# Patient Record
Sex: FEMALE | Race: WHITE | NOT HISPANIC OR LATINO | URBAN - METROPOLITAN AREA
[De-identification: names, ages, dates, MRNs, and addresses within clinical notes are randomized per-mention and may not be internally consistent; named-entity substitution may affect disease eponyms.]

---

## 2017-08-17 ENCOUNTER — TRANSCRIBE ORDERS (OUTPATIENT)
Dept: PHYSICAL THERAPY | Facility: HOSPITAL | Age: 57
End: 2017-08-17

## 2017-08-17 ENCOUNTER — HOSPITAL ENCOUNTER (OUTPATIENT)
Dept: PHYSICAL THERAPY | Facility: HOSPITAL | Age: 57
Setting detail: THERAPIES SERIES
Discharge: HOME | End: 2017-08-17

## 2017-08-17 DIAGNOSIS — M47.816 OSTEOARTHRITIS OF LUMBAR SPINE, UNSPECIFIED SPINAL OSTEOARTHRITIS COMPLICATION STATUS: Primary | ICD-10-CM

## 2017-08-17 DIAGNOSIS — M79.662 BILATERAL CALF PAIN: ICD-10-CM

## 2017-08-17 DIAGNOSIS — M54.50 CHRONIC BILATERAL LOW BACK PAIN WITHOUT SCIATICA: Primary | ICD-10-CM

## 2017-08-17 DIAGNOSIS — M79.661 BILATERAL CALF PAIN: ICD-10-CM

## 2017-08-17 DIAGNOSIS — G89.29 CHRONIC BILATERAL LOW BACK PAIN WITHOUT SCIATICA: Primary | ICD-10-CM

## 2017-08-17 PROCEDURE — 97162 PT EVAL MOD COMPLEX 30 MIN: CPT

## 2017-08-18 NOTE — THERAPY EVALUATION
Outpatient Physical Therapy Ortho Initial Evaluation  [facility]     Patient Name: Nancy Thomas  : 1960  MRN: [id number]  Today's Date: 2017      Visit Date: 2017      Past Medical History:   Diagnosis Date   • OA (osteoarthritis of spine)    • Osteoporosis    • Vitamin D deficiency        Visit Dx:     ICD-10-CM ICD-9-CM   1. Chronic bilateral low back pain without sciatica M54.5 724.2    G89.29 338.29   2. Bilateral calf pain M79.661 729.5    M79.662              Patient History       17 1345          History    Chief Complaint Difficulty with daily activities;Joint stiffness;Muscle tenderness;Muscle weakness;Pain  -MM      Type of Pain Back pain   Also reported throughout her legs, in her left arm  -MM      Date Current Problem(s) Began 17  -MM      Brief Description of Current Complaint Client presents with low back pain that worsened about 2 months ago when she pushed her bed and mattress.  She also reports pain in the joints throughout her legs.  She reports pain also in her left arm.  Frequently at night she is waking with muscle cramping and both calf muscles.  -MM      Patient/Caregiver Goals Relieve pain;Improve mobility;Improve strength;Return to prior level of function  -MM      What clinical tests have you had for this problem? X-ray  -MM      Results of Clinical Tests OA of spine  -MM      Are you or can you be pregnant No  -MM      Pain     Pain Location Back   both legs/hips, and left UE  -MM      Pain at Present 6  -MM      Pain at Best 0  -MM      Pain at Worst 8  -MM      Pain Frequency Intermittent   daily  -MM      Pain Description Aching;Throbbing;Tiring  -MM      Pain Comments Pain is worse with activity.  -MM      Is your sleep disturbed? --   sometimes wake at night with calf muscles cramping  -MM      Difficulties with ADL's? unable to lift anything heavy, prolonged standing and cooking, unable to sit for long periods, difficulty cleaning  -MM       Fall Risk Assessment    Any falls in the past year: No  -MM      Daily Activities    Primary Language Faroese  -MM      Action taken if English not primary language Her  interprets  -MM      Are you able to read Yes   Yi  -MM      Are you able to write Yes   Yi  -MM      How does patient learn best? Demonstration  -MM      Pt Participated in POC and Goals Yes  -MM        User Key  (r) = Recorded By, (t) = Taken By, (c) = Cosigned By    Initials Name Provider Type    MM Alverto Calderon, PT Physical Therapist                PT Ortho       08/17/17 6284    Posture/Observations    Posture/Observations Comments No signs of scoliosis.  Pelvic alignment appears symmetrical.  -MM    Lumbar ROM Screen- Lower Quarter Clearing    Lumbar Flexion Impaired   Full range with pain at end range of movement  -MM    Lumbar Extension Impaired   15° without pain  -MM    Lumbar Lateral Flexion Impaired   Right: 19°; left: 15°; some thoracic pain bilateral  -MM    Lumbar Rotation Impaired   Right: 47°; left: 55°; some lower lumbar pain  -MM    Lumbar Quadrant  Unable to perform   Attempted, but lacked extension.  -MM    SI/Hip Screen- Lower Quarter Clearing    ASIS compression Negative  -MM    ASIS distraction Negative  -MM    Posterior thigh sheer Negative  -MM    Special Tests/Palpation    Special Tests/Palpation --   Some relief with gentle manual traction pelvic harness  -MM    Lumbar/SI Special Tests    SLR (Neural Tension) Negative  -MM    ROM (Range of Motion)    General ROM Detail Palpation: Marked tenderness central spinous process of L3/L4 and paravertebrals bilateral.  Client has pain with trunk flexion and tolerates trunk extension without pain.  Trunk extension is limited with overall mobility.  Increased pain with double knee to chest stretching.  -MM    MMT (Manual Muscle Testing)    General MMT Assessment Detail Trunk: Performs curl ups without pain, able to perform bilateral lower extremity raises  without pain.  -MM    Left Hip    Hip Flexion Gross Movement (4-/5) good minus  -MM    Hip Extension Gross Movement (4-/5) good minus   some pain  -MM    Hip ABduction Gross Movement (4-/5) good minus  -MM    Hip ADduction Gross Movement (5/5) normal  -MM    Right Hip    Hip Flexion Gross Movement (4-/5) good minus  -MM    Hip Extension Gross Movement (4-/5) good minus  -MM    Hip ABduction Gross Movement (4-/5) good minus  -MM    Hip ADduction Gross Movement (5/5) normal  -MM    Left Knee    Knee Extension Gross Movement (5/5) normal  -MM    Knee Flexion Gross Movement (4+/5) good plus  -MM    Right Knee    Knee Extension Gross Movement (5/5) normal  -MM    Knee Flexion Gross Movement (4/5) good  -MM    Left Ankle/Foot    Ankle PF Gross Movement (5/5) normal  -MM    Ankle Dorsiflexion Gross Movement (5/5) normal  -MM    Right Ankle/Foot    Ankle PF Gross Movement (5/5) normal  -MM    Ankle Dorsiflexion Gross Movement (5/5) normal  -MM      User Key  (r) = Recorded By, (t) = Taken By, (c) = Cosigned By    Initials Name Provider Type    TONO Calderon, PT Physical Therapist                            Therapy Education       08/17/17 1345          Therapy Education    Given HEP  -MM      Program New  -MM      How Provided Verbal;Demonstration;Written  -MM      Provided to Patient  -MM      Level of Understanding Verbalized  -MM        User Key  (r) = Recorded By, (t) = Taken By, (c) = Cosigned By    Initials Name Provider Type    TONO Calderon, PT Physical Therapist                PT OP Goals       08/17/17 1345       PT Short Term Goals    STG Date to Achieve 09/08/17  -MM     STG 1 Client will report 75% improvement in low back and leg pain.  -MM     STG 1 Progress New  -MM     STG 2 Client will be independent with home program to minimize pain and improve overall function.  -MM     STG 2 Progress New  -MM     STG 3 Lower lumbar tenderness will resolve.   -MM     STG 3 Progress New  -MM     STG 4 Client  will demonstrate full trunk flexion for picking up items from the floor without difficulty.  -MM     STG 4 Progress New  -MM     Long Term Goals    LTG Date to Achieve 09/15/17  -MM     LTG 1 Modified Oswestry score will improve to 12% disability or better.   -MM     LTG 1 Progress New  -MM     LTG 2 Trunk extension AROM will improve to 25 degrees without pain.  -MM     LTG 2 Progress New  -MM     LTG 3 Trunk rotation will improve to 60 degrees bilateral.   -MM     LTG 3 Progress New  -MM     LTG 4 Bilateral hip strength will improve to 4+/5.   -MM     LTG 4 Progress New  -MM     LTG 5 Bilateral knee flexion strength will improve to 5/5.   -MM     LTG 5 Progress New  -MM     Time Calculation    PT Goal Re-Cert Due Date 09/15/17  -MM       User Key  (r) = Recorded By, (t) = Taken By, (c) = Cosigned By    Initials Name Provider Type    MM Alverto Calderon, PT Physical Therapist                PT Assessment/Plan       08/17/17 1345       PT Assessment    Functional Limitations Performance in self-care ADL;Limitation in home management;Limitations in community activities;Performance in leisure activities  -MM     Impairments Muscle strength;Pain;Joint mobility;Range of motion  -MM     Assessment Comments Client presents with evolving symptoms of moderate complexity. She has comorbidities of osteoporosis and osteoarthritis. Her primary language is Upper sorbian. Client has back and bilateral leg pain. She also notes some left arm pain. Signs and symptoms are consistent with lower lumbar strain and pain related to degenerative changes in the spine and arthritis. Hip strength is impaired throughout. Knee flexion strength is also impaired. Client notes some relief with gentle manual traction. She has pain with trunk flexion movements. Trunk extension is limited due to stiffness, but she doesn't have pain stretching into extension. Skilled intervention is required to minimize pain and improve overall function.   -MM     Please  refer to paper survey for additional self-reported information Yes  -MM     Rehab Potential Good  -MM     Patient/caregiver participated in establishment of treatment plan and goals Yes  -MM     Patient would benefit from skilled therapy intervention Yes  -MM     PT Plan    PT Frequency 2x/week  -MM     Predicted Duration of Therapy Intervention (days/wks) 6 weeks  -MM     Planned CPT's? PT EVAL MOD COMPLELITY: 76227;PT THER PROC EA 15 MIN: 56196;PT MANUAL THERAPY EA 15 MIN: 00443;PT TRACTION LUMBAR: 42836;PT ULTRASOUND EA 15 MIN: 69841;PT HOT/COLD PACK WC NONMCARE: 93900  -MM     PT Plan Comments Continue per plan of treatment with trunk stretching exercises as tolerated. Also include stability exercises. Include ASTYM for the lumbar spine and lower legs. Also consider gentle manual traction using pelvic harness.   -MM       User Key  (r) = Recorded By, (t) = Taken By, (c) = Cosigned By    Initials Name Provider Type    TONO Calderon, PT Physical Therapist                                    Outcome Measures       08/17/17 1345          Modified Oswestry    Modified Oswestry Score/Comments 34%  -MM      Functional Assessment    Outcome Measure Options Modifed Owestry  -MM        User Key  (r) = Recorded By, (t) = Taken By, (c) = Cosigned By    Initials Name Provider Type    TONO Calderon, PT Physical Therapist            Time Calculation:   Start Time: 1345     Therapy Charges for Today     Code Description Service Date Service Provider Modifiers Qty    20335370049  PT EVAL MOD COMPLEXITY 4 8/17/2017 Alverto Calderon, PT GP 1          PT G-Codes  Outcome Measure Options: Modifed Owestry         Alverto Calderon, PT  8/18/2017

## 2017-08-24 ENCOUNTER — HOSPITAL ENCOUNTER (OUTPATIENT)
Dept: PHYSICAL THERAPY | Facility: HOSPITAL | Age: 57
Setting detail: THERAPIES SERIES
Discharge: HOME | End: 2017-08-24

## 2017-08-24 DIAGNOSIS — M79.661 BILATERAL CALF PAIN: ICD-10-CM

## 2017-08-24 DIAGNOSIS — M54.50 CHRONIC BILATERAL LOW BACK PAIN WITHOUT SCIATICA: Primary | ICD-10-CM

## 2017-08-24 DIAGNOSIS — M79.662 BILATERAL CALF PAIN: ICD-10-CM

## 2017-08-24 DIAGNOSIS — G89.29 CHRONIC BILATERAL LOW BACK PAIN WITHOUT SCIATICA: Primary | ICD-10-CM

## 2017-08-24 PROCEDURE — 97140 MANUAL THERAPY 1/> REGIONS: CPT

## 2017-08-24 PROCEDURE — 97110 THERAPEUTIC EXERCISES: CPT

## 2017-08-24 NOTE — THERAPY TREATMENT NOTE
Outpatient Physical Therapy Ortho Treatment Note  [facility]     Patient Name: Nancy Thomas  : 1960  MRN: [id number]  Today's Date: 2017      Visit Date: 2017    Visit Dx:    ICD-10-CM ICD-9-CM   1. Chronic bilateral low back pain without sciatica M54.5 724.2    G89.29 338.29   2. Bilateral calf pain M79.661 729.5    M79.662           Past Medical History:   Diagnosis Date   • OA (osteoarthritis of spine)    • Osteoporosis    • Vitamin D deficiency                         PT Assessment/Plan       17 1300       PT Assessment    Assessment Comments Client continues with pain in multiple areas consistent with arthritis. No complaints with exercises. Some relief with ASTYM and manual traction.  -MM     PT Plan    PT Plan Comments Continue per plan of treatment at 2x/week.   -MM       User Key  (r) = Recorded By, (t) = Taken By, (c) = Cosigned By    Initials Name Provider Type    TONO Calderon, PT Physical Therapist                    Exercises       17 1300          Subjective Comments    Subjective Comments Client reports a lot of pain through her neck and arms to her hands today. She also reported mild low back pain. Pain overall was rated at 7/10.  -MM      Subjective Pain    Able to rate subjective pain? yes  -MM      Pre-Treatment Pain Level 7  -MM      Post-Treatment Pain Level 7  -MM      Exercise 1    Exercise Name 1 Included exercises in clinical setting per fllow sheet. Updated and reviewed home exercises to include stability ball exercises.   -MM      Cueing 1 Verbal  -MM      Time (Minutes) 1 30  -MM      Additional Comments therapeutic exercise  -MM        User Key  (r) = Recorded By, (t) = Taken By, (c) = Cosigned By    Initials Name Provider Type    TONO Calderon, PT Physical Therapist                        Manual Rx (last 36 hours)      Manual Treatments       17 1300          Manual Rx 1    Manual Rx 1 Location lumbar spine  -MM      Manual  Rx 1 Type Provided soft tissue mobilization using ASTYM technique for paravertebrals. Client positioned prone. Moderate pressure used. Also included manual traction with pelvic harness (5')  -MM      Manual Rx 1 Duration 15  -MM        User Key  (r) = Recorded By, (t) = Taken By, (c) = Cosigned By    Initials Name Provider Type    TONO Calderon, PT Physical Therapist                        Time Calculation:   Start Time: 1300  Total Timed Code Minutes- PT: 45 minute(s)    Therapy Charges for Today     Code Description Service Date Service Provider Modifiers Qty    45849329964  PT MANUAL THERAPY EA 15 MIN 8/24/2017 Alverto Calderon, PT GP 1    81510260746 HC PT THER PROC EA 15 MIN 8/24/2017 Alverto Calderon, PT GP 2                    Alverto Calderon, PT  8/24/2017

## 2017-08-28 ENCOUNTER — HOSPITAL ENCOUNTER (OUTPATIENT)
Dept: PHYSICAL THERAPY | Facility: HOSPITAL | Age: 57
Setting detail: THERAPIES SERIES
Discharge: HOME | End: 2017-08-28

## 2017-08-28 DIAGNOSIS — G89.29 CHRONIC BILATERAL LOW BACK PAIN WITHOUT SCIATICA: Primary | ICD-10-CM

## 2017-08-28 DIAGNOSIS — M54.50 CHRONIC BILATERAL LOW BACK PAIN WITHOUT SCIATICA: Primary | ICD-10-CM

## 2017-08-28 DIAGNOSIS — M79.661 BILATERAL CALF PAIN: ICD-10-CM

## 2017-08-28 DIAGNOSIS — M79.662 BILATERAL CALF PAIN: ICD-10-CM

## 2017-08-28 PROCEDURE — 97110 THERAPEUTIC EXERCISES: CPT

## 2017-08-28 PROCEDURE — 97140 MANUAL THERAPY 1/> REGIONS: CPT

## 2017-08-28 NOTE — THERAPY TREATMENT NOTE
Outpatient Physical Therapy Ortho Treatment Note  [facility]     Patient Name: Nancy Thomas  : 1960  MRN: [id number]  Today's Date: 2017      Visit Date: 2017    Visit Dx:    ICD-10-CM ICD-9-CM   1. Chronic bilateral low back pain without sciatica M54.5 724.2    G89.29 338.29   2. Bilateral calf pain M79.661 729.5    M79.662          Past Medical History:   Diagnosis Date   • OA (osteoarthritis of spine)    • Osteoporosis    • Vitamin D deficiency                PT Assessment/Plan       17 1300       PT Assessment    Assessment Comments Limited time on nu-step due to right knee discomfort. Also held on reverse curl-ups due to discomfort. Other exercises were all tolerated without complaints. Held on manual traction due to discomfort after last visit. No complaints with ASTYM.  -MM     PT Plan    PT Plan Comments Continue per plan of treatment with manual therapy and exercises.   -MM       User Key  (r) = Recorded By, (t) = Taken By, (c) = Cosigned By    Initials Name Provider Type    TONO Calderon, PT Physical Therapist                    Exercises       17 1300          Subjective Comments    Subjective Comments Client reports mild pain at the time of treatment today, but overall ranging from 0-7/10. She had quite a bit of pain over the weekend. She also feels that she may have had some increased disomfort from manual tractoin.  -MM      Subjective Pain    Able to rate subjective pain? yes  -MM      Pre-Treatment Pain Level 7  -MM      Post-Treatment Pain Level 5  -MM      Exercise 1    Exercise Name 1 Included exercises in clinical setting per fllow sheet. Updated and reviewed home exercises to include stability ball exercises.   -MM      Cueing 1 Verbal  -MM      Time (Minutes) 1 30  -MM      Additional Comments ther-ex  -MM        User Key  (r) = Recorded By, (t) = Taken By, (c) = Cosigned By    Initials Name Provider Type    TONO Calderon, PT Physical  Therapist                        Manual Rx (last 36 hours)      Manual Treatments       08/28/17 1300          Manual Rx 1    Manual Rx 1 Location lumbar spine  -MM      Manual Rx 1 Type Provided soft tissue mobilization using ASTYM technique for paravertebrals. Client positioned prone. Moderate pressure used.   -MM      Manual Rx 1 Duration 15  -MM        User Key  (r) = Recorded By, (t) = Taken By, (c) = Cosigned By    Initials Name Provider Type    MM Alverto Calderon, PT Physical Therapist                        Time Calculation:   Start Time: 1300  Total Timed Code Minutes- PT: 45 minute(s)    Therapy Charges for Today     Code Description Service Date Service Provider Modifiers Qty    60296840183  PT MANUAL THERAPY EA 15 MIN 8/28/2017 Alverto Calderon, PT GP 1    [id number]  PT THER PROC EA 15 MIN 8/28/2017 Alverto Calderon, PT GP 2                    Alverto Calderon, PT  8/28/2017

## 2017-09-08 ENCOUNTER — HOSPITAL ENCOUNTER (OUTPATIENT)
Dept: PHYSICAL THERAPY | Facility: HOSPITAL | Age: 57
Setting detail: THERAPIES SERIES
Discharge: HOME | End: 2017-09-08

## 2017-09-08 DIAGNOSIS — M79.661 BILATERAL CALF PAIN: ICD-10-CM

## 2017-09-08 DIAGNOSIS — G89.29 CHRONIC BILATERAL LOW BACK PAIN WITHOUT SCIATICA: Primary | ICD-10-CM

## 2017-09-08 DIAGNOSIS — M79.662 BILATERAL CALF PAIN: ICD-10-CM

## 2017-09-08 DIAGNOSIS — M54.50 CHRONIC BILATERAL LOW BACK PAIN WITHOUT SCIATICA: Primary | ICD-10-CM

## 2017-09-08 PROCEDURE — 97110 THERAPEUTIC EXERCISES: CPT

## 2017-09-08 PROCEDURE — 97140 MANUAL THERAPY 1/> REGIONS: CPT

## 2017-09-08 NOTE — THERAPY TREATMENT NOTE
Outpatient Physical Therapy Ortho Treatment Note  [facility]     Patient Name: Nancy Thomas  : 1960  MRN: [id number]  Today's Date: 2017      Visit Date: 2017    Visit Dx:    ICD-10-CM ICD-9-CM   1. Chronic bilateral low back pain without sciatica M54.5 724.2    G89.29 338.29   2. Bilateral calf pain M79.661 729.5    M79.662        Past Medical History:   Diagnosis Date   • OA (osteoarthritis of spine)    • Osteoporosis    • Vitamin D deficiency                PT Assessment/Plan       17 1115       PT Assessment    Assessment Comments Exercises were all tolerated well. She is somewhat limited with standing trunk rotation holding dumbbells because her hands are bothering her and she has difficulty holding items. Based off of her description of pain in multiple joints throughout her upper body signs and symptoms seem to be consistent with OA. She did not some relief with ASTYM for her lumbar and superior gluteal region. Overall back pain is improving.  -MM     PT Plan    PT Plan Comments Continue per plan of treatment and re-exam next visit.  -MM       User Key  (r) = Recorded By, (t) = Taken By, (c) = Cosigned By    Initials Name Provider Type    MM Alverto Caledron, PT Physical Therapist                    Exercises       17 1115          Subjective Comments    Subjective Comments Client reports quite a bit of bilateral arm, elbow, and hand pain today. She also reports some low back and right superior hip discomfort. She fell earlier in the week due when she had difficulty lifting a watermelon in the grocery.  -MM      Subjective Pain    Able to rate subjective pain? yes  -MM      Pre-Treatment Pain Level 2  -MM      Post-Treatment Pain Level 2  -MM      Exercise 1    Exercise Name 1 Continued exercises in clinical setting per flow sheet. Progressed exercises to include standing trunk rotation.  -MM      Cueing 1 Verbal  -MM      Time (Minutes) 1 30  -MM      Additional  Comments ther ex  -MM        User Key  (r) = Recorded By, (t) = Taken By, (c) = Cosigned By    Initials Name Provider Type    TONO Calderon, PT Physical Therapist                        Manual Rx (last 36 hours)      Manual Treatments       09/08/17 1115          Manual Rx 1    Manual Rx 1 Location lumbar spine  -MM      Manual Rx 1 Type Provided soft tissue mobilization using ASTYM technique for paravertebrals and superior gluteal region bilateral. Client positioned prone. Moderate pressure used.   -MM      Manual Rx 1 Duration 15  -MM        User Key  (r) = Recorded By, (t) = Taken By, (c) = Cosigned By    Initials Name Provider Type    TONO Calderon, PT Physical Therapist                        Time Calculation:   Start Time: 1115  Total Timed Code Minutes- PT: 45 minute(s)    Therapy Charges for Today     Code Description Service Date Service Provider Modifiers Qty    98403743091  PT MANUAL THERAPY EA 15 MIN 9/8/2017 Alverto Calderon, PT GP 1    [id number]  PT THER PROC EA 15 MIN 9/8/2017 Alverto Calderon, PT GP 2                    Alverto Calderon, PT  9/8/2017

## 2017-09-11 ENCOUNTER — HOSPITAL ENCOUNTER (OUTPATIENT)
Dept: PHYSICAL THERAPY | Facility: HOSPITAL | Age: 57
Setting detail: THERAPIES SERIES
Discharge: HOME | End: 2017-09-11

## 2017-09-11 DIAGNOSIS — M79.661 BILATERAL CALF PAIN: ICD-10-CM

## 2017-09-11 DIAGNOSIS — M79.662 BILATERAL CALF PAIN: ICD-10-CM

## 2017-09-11 DIAGNOSIS — G89.29 CHRONIC BILATERAL LOW BACK PAIN WITHOUT SCIATICA: Primary | ICD-10-CM

## 2017-09-11 DIAGNOSIS — M54.50 CHRONIC BILATERAL LOW BACK PAIN WITHOUT SCIATICA: Primary | ICD-10-CM

## 2017-09-11 PROCEDURE — 97140 MANUAL THERAPY 1/> REGIONS: CPT

## 2017-09-11 PROCEDURE — 97110 THERAPEUTIC EXERCISES: CPT

## 2017-09-11 NOTE — THERAPY TREATMENT NOTE
Outpatient Physical Therapy Ortho Treatment Note  [facility]     Patient Name: Nancy Thomas  : 1960  MRN: [id number]  Today's Date: 2017      Visit Date: 2017    Visit Dx:    ICD-10-CM ICD-9-CM   1. Chronic bilateral low back pain without sciatica M54.5 724.2    G89.29 338.29   2. Bilateral calf pain M79.661 729.5    M79.662        Past Medical History:   Diagnosis Date   • OA (osteoarthritis of spine)    • Osteoporosis    • Vitamin D deficiency                 PT Assessment/Plan       17 1030       PT Assessment    Assessment Comments Client continues with frequent joint pain in multiple areas. She tolerated exercises without complaints. She does report some relief with ASTYM. She noted that her physician updated her medicine today to minimize pain.  -MM     PT Plan    PT Plan Comments Continue per plan of treatment. Re-exam is due next visit.  -MM       User Key  (r) = Recorded By, (t) = Taken By, (c) = Cosigned By    Initials Name Provider Type    TONO Calderon, PT Physical Therapist                    Exercises       17 1030          Subjective Comments    Subjective Comments Client reports that she saw her physician earlier this morning.  She told her doctor about continued problems with bilateral arm pain and joint pain in addition to low back and leg pain.  She reports that she was prescribed a new medication.  -MM      Subjective Pain    Able to rate subjective pain? yes  -MM      Pre-Treatment Pain Level 6  -MM      Post-Treatment Pain Level 5  -MM      Exercise 1    Exercise Name 1 Included exercises in clinical setting per flow sheet.  -MM      Cueing 1 Verbal;Demo  -MM      Time (Minutes) 1 30  -MM      Additional Comments ther ex  -MM        User Key  (r) = Recorded By, (t) = Taken By, (c) = Cosigned By    Initials Name Provider Type    TONO Calderon, PT Physical Therapist                        Manual Rx (last 36 hours)      Manual Treatments        09/11/17 1030          Manual Rx 1    Manual Rx 1 Location lumbar spine  -MM      Manual Rx 1 Type Provided soft tissue mobilization using ASTYM technique for paravertebrals and superior gluteal region bilateral. Client positioned prone. Moderate pressure used.   -MM      Manual Rx 1 Duration 15  -MM        User Key  (r) = Recorded By, (t) = Taken By, (c) = Cosigned By    Initials Name Provider Type    MM Alverto Calderon, PT Physical Therapist                        Time Calculation:   Start Time: 1030  Total Timed Code Minutes- PT: 45 minute(s)    Therapy Charges for Today     Code Description Service Date Service Provider Modifiers Qty    [id number]  PT MANUAL THERAPY EA 15 MIN 9/11/2017 Alverto Calderon, PT GP 1    14356786808 HC PT THER PROC EA 15 MIN 9/11/2017 Alverto Calderon, PT GP 2                    Alverto Calderon, PT  9/11/2017

## 2017-09-18 ENCOUNTER — HOSPITAL ENCOUNTER (OUTPATIENT)
Dept: PHYSICAL THERAPY | Facility: HOSPITAL | Age: 57
Setting detail: THERAPIES SERIES
Discharge: HOME | End: 2017-09-18

## 2017-09-18 DIAGNOSIS — G89.29 CHRONIC BILATERAL LOW BACK PAIN WITHOUT SCIATICA: Primary | ICD-10-CM

## 2017-09-18 DIAGNOSIS — M79.661 BILATERAL CALF PAIN: ICD-10-CM

## 2017-09-18 DIAGNOSIS — M79.662 BILATERAL CALF PAIN: ICD-10-CM

## 2017-09-18 DIAGNOSIS — M54.50 CHRONIC BILATERAL LOW BACK PAIN WITHOUT SCIATICA: Primary | ICD-10-CM

## 2017-09-18 PROCEDURE — 97140 MANUAL THERAPY 1/> REGIONS: CPT

## 2017-09-18 PROCEDURE — 97110 THERAPEUTIC EXERCISES: CPT

## 2017-09-18 NOTE — THERAPY DISCHARGE NOTE
Outpatient Physical Therapy Ortho Progress Note/Discharge Summary   [facility]     Patient Name: Nancy Thomas  : 1960  MRN: [id number]  Today's Date: 2017      Visit Date: 2017    Visit Dx:    ICD-10-CM ICD-9-CM   1. Chronic bilateral low back pain without sciatica M54.5 724.2    G89.29 338.29   2. Bilateral calf pain M79.661 729.5    M79.662        Past Medical History:   Diagnosis Date   • OA (osteoarthritis of spine)    • Osteoporosis    • Vitamin D deficiency         No past surgical history on file.          PT Ortho       17 1030    Lumbar ROM Screen- Lower Quarter Clearing    Lumbar Flexion Normal  -MM    Lumbar Extension Impaired   22°  -MM    Lumbar Lateral Flexion Impaired   Right: 19°; left: 15°; some thoracic pain bilateral  -MM    Lumbar Rotation Impaired   Right: 47°; left: 55°; some lower lumbar pain  -MM    Special Tests/Palpation    Special Tests/Palpation --   mild tenderness right lower lumbar paraspinals  -MM    Left Hip    Hip Flexion Gross Movement (4+/5) good plus  -MM    Hip Extension Gross Movement (4+/5) good plus  -MM    Hip ABduction Gross Movement (4+/5) good plus  -MM    Hip ADduction Gross Movement (5/5) normal  -MM    Right Hip    Hip Flexion Gross Movement (4+/5) good plus  -MM    Hip Extension Gross Movement (4+/5) good plus  -MM    Hip ABduction Gross Movement (4+/5) good plus  -MM    Hip ADduction Gross Movement (5/5) normal  -MM    Left Knee    Knee Extension Gross Movement (5/5) normal  -MM    Knee Flexion Gross Movement (4+/5) good plus  -MM    Right Knee    Knee Extension Gross Movement (5/5) normal  -MM    Knee Flexion Gross Movement (4+/5) good plus  -MM      User Key  (r) = Recorded By, (t) = Taken By, (c) = Cosigned By    Initials Name Provider Type    TONO Calderon, PT Physical Therapist                            PT Assessment/Plan       17 1030       PT Assessment    Assessment Comments Overall client reached the maximum  benefit of physical therapy at this time.  She continues with mild low back discomfort.  She also has pain in multiple joints throughout her shoulders, elbows, and hands.  Symptoms are consistent with osteoarthritis.  She tolerates exercises well demonstrating good ability to continue exercises on her own independently.  She notes some relief with ASTYM.  Modified Oswestry score improved from 34% to 24% disability.  Trunk range of motion improved for flexion and extension today.  Range of motion movement for side bending and rotation were unchanged.  She did not report any pain with overall trunk mobility today.  She had mild tenderness right lower lumbar paravertebrals.  Bilateral hip strength improved to 4+/5 throughout.  Bilateral knee flexion strength improved to 4+/5. Prognosis at discharge is fair.   -MM     Please refer to paper survey for additional self-reported information Yes  -MM     PT Plan    PT Plan Comments Discharge due to reaching the maximum benefit of treatment at this time.   -MM       User Key  (r) = Recorded By, (t) = Taken By, (c) = Cosigned By    Initials Name Provider Type    TONO Calderon, PT Physical Therapist                    Exercises       09/18/17 1030          Subjective Comments    Subjective Comments Client reports very mild low back pain today right lumbar region. She continues with frequent pain throughout bilateral shoulders, elbows, and hands.   -MM      Subjective Pain    Able to rate subjective pain? yes  -MM      Pre-Treatment Pain Level 2  -MM      Post-Treatment Pain Level 2  -MM      Exercise 1    Exercise Name 1 Included time for re-exam. Updated home program.   -MM      Cueing 1 Verbal  -MM      Time (Minutes) 1 30  -MM      Additional Comments ther ex  -MM        User Key  (r) = Recorded By, (t) = Taken By, (c) = Cosigned By    Initials Name Provider Type    TONO Calderon, PT Physical Therapist                        Manual Rx (last 36 hours)      Manual  Treatments       09/18/17 1030          Manual Rx 1    Manual Rx 1 Location lumbar spine  -MM      Manual Rx 1 Type Provided soft tissue mobilization using ASTYM technique for paravertebrals and superior gluteal region bilateral. Client positioned prone. Moderate pressure used.   -MM      Manual Rx 1 Duration 15  -MM        User Key  (r) = Recorded By, (t) = Taken By, (c) = Cosigned By    Initials Name Provider Type    MM Alverto Calderon, PT Physical Therapist                PT OP Goals       09/18/17 1030       PT Short Term Goals    STG Date to Achieve 09/08/17  -MM     STG 1 Client will report 75% improvement in low back and leg pain.  -MM     STG 1 Progress Met  -MM     STG 2 Client will be independent with home program to minimize pain and improve overall function.  -MM     STG 2 Progress Met  -MM     STG 3 Lower lumbar tenderness will resolve.   -MM     STG 3 Progress Not Met  -MM     STG 3 Progress Comments mild right lumbar tenderness  -MM     STG 4 Client will demonstrate full trunk flexion for picking up items from the floor without difficulty.  -MM     STG 4 Progress Met  -MM     Long Term Goals    LTG Date to Achieve 09/15/17  -MM     LTG 1 Modified Oswestry score will improve to 12% disability or better.   -MM     LTG 1 Progress Not Met  -MM     LTG 1 Progress Comments plateaued at 24%  -MM     LTG 2 Trunk extension AROM will improve to 25 degrees without pain.  -MM     LTG 2 Progress Not Met  -MM     LTG 2 Progress Comments improved to 22 degrees  -MM     LTG 3 Trunk rotation will improve to 60 degrees bilateral.   -MM     LTG 3 Progress Not Met  -MM     LTG 3 Progress Comments unchanged  -MM     LTG 4 Bilateral hip strength will improve to 4+/5.   -MM     LTG 4 Progress Met  -MM     LTG 5 Bilateral knee flexion strength will improve to 5/5.   -MM     LTG 5 Progress Not Met  -MM     LTG 5 Progress Comments 4+/5  -MM       User Key  (r) = Recorded By, (t) = Taken By, (c) = Cosigned By    Initials Name  Provider Type    MM Alverto Calderon, PT Physical Therapist                    Outcome Measures       09/18/17 1030          Modified Oswestry    Modified Oswestry Score/Comments 24%  -MM      Functional Assessment    Outcome Measure Options Modifed Owestry  -MM        User Key  (r) = Recorded By, (t) = Taken By, (c) = Cosigned By    Initials Name Provider Type    MM Alverto Calderon, PT Physical Therapist            Time Calculation:   Start Time: 1030  Total Timed Code Minutes- PT: 45 minute(s)    Therapy Charges for Today     Code Description Service Date Service Provider Modifiers Qty    07051526647 HC PT THER PROC EA 15 MIN 9/18/2017 Alverto Calderon, PT GP 2    57199332597 HC PT MANUAL THERAPY EA 15 MIN 9/18/2017 Alverto Calderon, PT GP 1          PT G-Codes  Outcome Measure Options: Modifed Owestry     OP PT Discharge Summary  Date of Discharge: 09/18/17  Reason for Discharge: Maximum functional potential achieved  Outcomes Achieved: Refer to plan of care for updates on goals achieved  Discharge Destination: Home with home program  Discharge Instructions: Client to continue exercises on her own. She has a membership at [employer] and will continue exercising.      Alverto Calderon, PT  9/18/2017

## 2018-03-28 ENCOUNTER — HOSPITAL ENCOUNTER (OUTPATIENT)
Dept: PHYSICAL THERAPY | Facility: HOSPITAL | Age: 58
Setting detail: THERAPIES SERIES
Discharge: HOME | End: 2018-03-28

## 2018-03-28 DIAGNOSIS — M54.42 ACUTE LEFT-SIDED LOW BACK PAIN WITH LEFT-SIDED SCIATICA: Primary | ICD-10-CM

## 2018-03-28 PROCEDURE — 97161 PT EVAL LOW COMPLEX 20 MIN: CPT

## 2018-03-28 NOTE — THERAPY EVALUATION
Outpatient Physical Therapy Ortho Initial Evaluation  [facility]     Patient Name: Nancy Thomas  : 1960  MRN: [id number]  Today's Date: 3/28/2018      Visit Date: 2018      Past Medical History:   Diagnosis Date   • OA (osteoarthritis of spine)    • Osteoporosis    • Vitamin D deficiency      Visit Dx:     ICD-10-CM ICD-9-CM   1. Acute left-sided low back pain with left-sided sciatica M54.42 724.2     724.3             Patient History     Row Name 18 1330             History    Chief Complaint Difficulty with daily activities;Muscle tenderness;Muscle weakness;Pain;Tightness  -MM      Type of Pain Back pain;Lower Extremity / Leg  -MM      Date Current Problem(s) Began 18  -MM      Brief Description of Current Complaint Client presents with low back pain and intermittent left calf pain that started 3 weeks ago.  She noticed pain that increased after sleeping on the floor.  She had visitors at her house and slept on the floor so the visitors could sleep in the bed.  As a result she started having back pain.  She has a history of osteoporosis and chronic intermittent back pain.  -MM      Patient/Caregiver Goals Relieve pain;Return to prior level of function  -MM      Are you or can you be pregnant No  -MM         Pain     Pain Location Back   Left calf  -MM      Pain at Present 7  -MM      Pain at Best 0  -MM      Pain at Worst 7  -MM      Pain Frequency Intermittent  -MM      Pain Description Aching;Tightness;Nagging  -MM      Pain Comments Pain is worse with prolonged standing and prolonged sitting.  She is sometimes limited to standing for 30 minutes.  She is unable to lift heavy items.  -MM      Difficulties with ADL's? Standing more than 30 minutes, lifting, walking, sitting for over an hour.  -MM         Fall Risk Assessment    Any falls in the past year: No  -MM         Daily Activities    Primary Language Azeri  -MM      Action taken if English not primary language Client  also speaks English and prefers to have her  interpret if needed.  -MM      Are you able to read Yes  -MM      Are you able to write Yes  -MM      How does patient learn best? Demonstration  -MM      Pt Participated in POC and Goals Yes  -MM        User Key  (r) = Recorded By, (t) = Taken By, (c) = Cosigned By    Initials Name Provider Type    MM Alverto Calderon, PT Physical Therapist                PT Ortho     Row Name 03/28/18 0180       Posture/Observations    Posture/Observations Comments No signs of scoliosis.  Overall posture is good.  Palpation: Marked tenderness bilateral lumbosacral region, left superior gluteal region.  -MM       Lumbar ROM Screen- Lower Quarter Clearing    Lumbar Flexion Normal  -MM    Lumbar Extension Impaired   15° with pain  -MM    Lumbar Lateral Flexion Impaired   Right: 23°; left: 22° with some pain  -MM    Lumbar Rotation Impaired   37° bilateral with mild pain  -MM    Lumbar Quadrant  Impaired   Pain bilateral  -MM       General ROM    GENERAL ROM COMMENTS Client tolerates flexion stretching well, but this is minimize some due to osteoporosis.  She does notice increased pain with extension stretching beyond neutral.  Mild pain also noted with prone leg raises.  -MM       General Assessment (Manual Muscle Testing)    Comment, General Manual Muscle Testing (MMT) Assessment Some pain reported with MMT hip extension bilateral.  -MM       Left Hip (Manual Muscle Testing)    MMT, Left Hip: Manual Muscle Testing (MMT) Flexion: 4+/5; extension: 4-/5; abduction 4+/5; adduction 4+/5.  -MM       Right Hip (Manual Muscle Testing)    MMT, Right Hip: Manual Muscle Testing (MMT) Flexion: 4+/5; extension: 4-/5; abduction 4+/5; adduction 4+/5.  -MM       Left Knee (Manual Muscle Testing)    Comment, Left Knee: Manual Muscle Testing (MMT) Flexion: 4+/5; extension: 5/5.  -MM       Right Knee (Manual Muscle Testing)    Comment, Right Knee: Manual Muscle Testing (MMT) Flexion: 4/5; extension:  4+/5.  -MM       Left Ankle/Foot (Manual Muscle Testing)    Comment, MMT: Left Ankle/Foot Dorsiflexion: 4+/5  -MM       Right Ankle/Foot (Manual Muscle Testing)    Comment, MMT: Right Ankle/Foot Dorsiflexion: 4+/5  -MM      User Key  (r) = Recorded By, (t) = Taken By, (c) = Cosigned By    Initials Name Provider Type    TONO Calderon PT Physical Therapist        Therapy Education  Education Details: Provided and reviewed home program.  Home exercises included: Sit to stand, bridging, alternate arm and leg raise prone, angry cat stretch and limited range, thoracic or lumbar side bend stretch on all fours, single knee to chest, lower trunk rotation supine.           PT OP Goals     Row Name 03/28/18 0550          PT Short Term Goals    STG Date to Achieve 04/18/18  -MM     STG 1 Client will report 75% improvement in low back pain.  -MM     STG 1 Progress New  -MM     STG 2 Radicular left calf pain will resolve.  -MM     STG 2 Progress New  -MM     STG 3 Lumbosacral tenderness will resolve.  -MM     STG 3 Progress New  -MM     STG 4 Client will be independent with home program to minimize pain and improve overall strength and mobility.  -MM     STG 4 Progress New  -MM        Long Term Goals    LTG Date to Achieve 04/25/18  -MM     LTG 1 Modified Oswestry score will improve to 16% or better.  -MM     LTG 1 Progress New  -MM     LTG 2 Client will return to lifting moderate weight from the floor to waist without difficulty.  -MM     LTG 2 Progress New  -MM     LTG 3 Client will return to regular daily activity in the home without difficulty.  -MM     LTG 3 Progress New  -MM        Time Calculation    PT Goal Re-Cert Due Date 06/26/18  -MM       User Key  (r) = Recorded By, (t) = Taken By, (c) = Cosigned By    Initials Name Provider Type    TONO Calderon PT Physical Therapist                PT Assessment/Plan     Row Name 03/28/18 1330          PT Assessment    Functional Limitations Limitation in home  management;Limitations in functional capacity and performance;Performance in leisure activities;Performance in self-care ADL  -MM     Impairments Pain;Range of motion;Muscle strength  -MM     Assessment Comments Client presents with evolving symptoms of low complexity.  Signs symptoms are consistent with low back pain and intermittent left calf pain related to degenerative changes and acute inflammation after sleeping on the floor.  Skilled intervention is required to normalize pain and improve overall strength and function.  Comorbidities include reported osteoporosis.  -MM     Please refer to paper survey for additional self-reported information Yes  -MM     Rehab Potential Good  -MM     Patient/caregiver participated in establishment of treatment plan and goals Yes  -MM     Patient would benefit from skilled therapy intervention Yes  -MM        PT Plan    PT Frequency 2x/week  -MM     Predicted Duration of Therapy Intervention (OT Eval) 8-10 visits  -MM     Planned CPT's? PT EVAL LOW COMPLEXITY: 27461;PT THER PROC EA 15 MIN: 98833;PT MANUAL THERAPY EA 15 MIN: 84609;PT ULTRASOUND EA 15 MIN: 34702;PT HOT/COLD PACK WC NONMCARE: 82390  -MM     PT Plan Comments Continue per plan of treatment with manual therapy and exercises.  Limit thoracic flexion due to osteoporosis.  Include ASTYM.  -MM       User Key  (r) = Recorded By, (t) = Taken By, (c) = Cosigned By    Initials Name Provider Type    MM Alverto Calderon, PT Physical Therapist        Outcome Measure Options: Ag Del Angel  Modified Oswestry  Modified Oswestry Score/Comments: 46%      Time Calculation:   Start Time: 1330     Therapy Charges for Today     Code Description Service Date Service Provider Modifiers Qty    57991945630  PT EVAL LOW COMPLEXITY 4 3/28/2018 Alverto Calderon, PT GP 1          PT G-Codes  Outcome Measure Options: Modifed Owestry         Alverto Calderon, PT  3/28/2018

## 2018-04-03 ENCOUNTER — TRANSCRIBE ORDERS (OUTPATIENT)
Dept: PHYSICAL THERAPY | Facility: HOSPITAL | Age: 58
End: 2018-04-03

## 2018-04-03 DIAGNOSIS — M47.816 LUMBAR SPONDYLOSIS: Primary | ICD-10-CM

## 2018-04-05 ENCOUNTER — HOSPITAL ENCOUNTER (OUTPATIENT)
Dept: PHYSICAL THERAPY | Facility: HOSPITAL | Age: 58
Setting detail: THERAPIES SERIES
Discharge: HOME | End: 2018-04-05

## 2018-04-05 DIAGNOSIS — M54.42 ACUTE LEFT-SIDED LOW BACK PAIN WITH LEFT-SIDED SCIATICA: Primary | ICD-10-CM

## 2018-04-05 PROCEDURE — 97140 MANUAL THERAPY 1/> REGIONS: CPT

## 2018-04-05 PROCEDURE — 97110 THERAPEUTIC EXERCISES: CPT

## 2018-04-05 NOTE — THERAPY TREATMENT NOTE
Outpatient Physical Therapy Ortho Treatment Note  [facility]     Patient Name: Nancy Thomas  : 1960  MRN: [id number]  Today's Date: 2018      Visit Date: 2018    Visit Dx:    ICD-10-CM ICD-9-CM   1. Acute left-sided low back pain with left-sided sciatica M54.42 724.2     724.3       Past Medical History:   Diagnosis Date   • OA (osteoarthritis of spine)    • Osteoporosis    • Vitamin D deficiency              PT Assessment/Plan     Row Name 18 1430          PT Assessment    Assessment Comments No complaints with exercises.  Mild tenderness with ASTYM.  -MM        PT Plan    PT Plan Comments Continue per plan of treatment with exercises and manual therapy.  -MM       User Key  (r) = Recorded By, (t) = Taken By, (c) = Cosigned By    Initials Name Provider Type    TONO Calderon, PT Physical Therapist                Exercises     Row Name 18 1430             Subjective Comments    Subjective Comments Client reports mild pain today bilateral lower lumbar region.  -MM         Subjective Pain    Able to rate subjective pain? yes  -MM      Pre-Treatment Pain Level 3  -MM      Post-Treatment Pain Level 2  -MM         Exercise 1    Exercise Name 1 Included exercises in clinical setting per flow sheet.   -MM      Time 1 20  -MM      Additional Comments ther ex  -MM        User Key  (r) = Recorded By, (t) = Taken By, (c) = Cosigned By    Initials Name Provider Type    TONO Calderon, PT Physical Therapist             Manual Rx (last 36 hours)      Manual Treatments     Row Name 18 1430             Manual Rx 1    Manual Rx 1 Location Bilateral lumbar region  -MM      Manual Rx 1 Type Provided soft tissue mobilization using ASTYM technique.  Client was positioned prone with moderate pressure used for ASTYM.  -MM      Manual Rx 1 Duration 10  -MM        User Key  (r) = Recorded By, (t) = Taken By, (c) = Cosigned By    Initials Name Provider Type    TONO Calderon, PT  Physical Therapist        Time Calculation:   Start Time: 1430  Total Timed Code Minutes- PT: 30 minute(s)    Therapy Charges for Today     Code Description Service Date Service Provider Modifiers Qty    58654707548  PT THER PROC EA 15 MIN 4/5/2018 Alverto Claderon, PT GP 1    [id number]  PT MANUAL THERAPY EA 15 MIN 4/5/2018 Alverto Calderon, PT GP 1                    Alverto Calderon, PT  4/5/2018

## 2018-04-10 ENCOUNTER — HOSPITAL ENCOUNTER (OUTPATIENT)
Dept: PHYSICAL THERAPY | Facility: HOSPITAL | Age: 58
Setting detail: THERAPIES SERIES
Discharge: HOME | End: 2018-04-10

## 2018-04-10 DIAGNOSIS — M54.42 ACUTE LEFT-SIDED LOW BACK PAIN WITH LEFT-SIDED SCIATICA: Primary | ICD-10-CM

## 2018-04-10 PROCEDURE — 97110 THERAPEUTIC EXERCISES: CPT

## 2018-04-10 PROCEDURE — 97140 MANUAL THERAPY 1/> REGIONS: CPT

## 2018-04-10 NOTE — THERAPY TREATMENT NOTE
Outpatient Physical Therapy Ortho Treatment Note  [facility]     Patient Name: Nancy Thomas  : 1960  MRN: [id number]  Today's Date: 4/10/2018      Visit Date: 04/10/2018    Visit Dx:    ICD-10-CM ICD-9-CM   1. Acute left-sided low back pain with left-sided sciatica M54.42 724.2     724.3       Past Medical History:   Diagnosis Date   • OA (osteoarthritis of spine)    • Osteoporosis    • Vitamin D deficiency              PT Assessment/Plan     Row Name 04/10/18 1500          PT Assessment    Assessment Comments Moderate pain today. No complaints with exercises. some relief with aSTYM.   -MM        PT Plan    PT Plan Comments Continue per plan of treatment with exercises and manual therapy.   -MM       User Key  (r) = Recorded By, (t) = Taken By, (c) = Cosigned By    Initials Name Provider Type    TONO Calderon, PT Physical Therapist                    Exercises     Row Name 04/10/18 1500             Subjective Comments    Subjective Comments Client reports moderate pain today after standing for a long time cooking yesterday.  -MM         Subjective Pain    Able to rate subjective pain? yes  -MM      Pre-Treatment Pain Level 5  -MM      Post-Treatment Pain Level 4  -MM         Exercise 1    Exercise Name 1 Continued exercises in clinical setting per flow sheet. Progressed exercises to include: back machine, trunk rotation with cable column, and bridge and curl.   -MM      Time 1 20  -MM      Additional Comments ther ex  -MM        User Key  (r) = Recorded By, (t) = Taken By, (c) = Cosigned By    Initials Name Provider Type    TONO Calderon, PT Physical Therapist             Manual Rx (last 36 hours)      Manual Treatments     Row Name 04/10/18 1500             Manual Rx 1    Manual Rx 1 Location Bilateral lumbar region  -MM      Manual Rx 1 Type Provided soft tissue mobilization using ASTYM technique.  Client was positioned prone with moderate pressure used for ASTYM.  -MM      Manual  Rx 1 Duration 10  -MM        User Key  (r) = Recorded By, (t) = Taken By, (c) = Cosigned By    Initials Name Provider Type    MM Alverto Calderon, PT Physical Therapist        Time Calculation:   Start Time: 1500  Total Timed Code Minutes- PT: 30 minute(s)    Therapy Charges for Today     Code Description Service Date Service Provider Modifiers Qty    45295393922  PT THER PROC EA 15 MIN 4/10/2018 Alverto Calderon, PT GP 1    [id number]  PT MANUAL THERAPY EA 15 MIN 4/10/2018 Alverto Calderon, PT GP 1                    Alverto Calderon, PT  4/10/2018

## 2018-04-12 ENCOUNTER — HOSPITAL ENCOUNTER (OUTPATIENT)
Dept: PHYSICAL THERAPY | Facility: HOSPITAL | Age: 58
Setting detail: THERAPIES SERIES
Discharge: HOME | End: 2018-04-12

## 2018-04-12 DIAGNOSIS — M54.42 ACUTE LEFT-SIDED LOW BACK PAIN WITH LEFT-SIDED SCIATICA: Primary | ICD-10-CM

## 2018-04-12 PROCEDURE — 97110 THERAPEUTIC EXERCISES: CPT

## 2018-04-12 PROCEDURE — 97140 MANUAL THERAPY 1/> REGIONS: CPT

## 2018-04-13 NOTE — THERAPY TREATMENT NOTE
Outpatient Physical Therapy Ortho Treatment Note  [facility]     Patient Name: Nancy Thomas  : 1960  MRN: [id number]  Today's Date: 2018      Visit Date: 2018    Visit Dx:    ICD-10-CM ICD-9-CM   1. Acute left-sided low back pain with left-sided sciatica M54.42 724.2     724.3         Past Medical History:   Diagnosis Date   • OA (osteoarthritis of spine)    • Osteoporosis    • Vitamin D deficiency                    PT Assessment/Plan     Row Name 18 1500          PT Assessment    Assessment Comments Client tolerated exercises without complaints.  She reports some relief with ASTYM.  -MM        PT Plan    PT Plan Comments Continue per plan of treatment with exercises and manual therapy.  -MM       User Key  (r) = Recorded By, (t) = Taken By, (c) = Cosigned By    Initials Name Provider Type    TONO Calderon, PT Physical Therapist                    Exercises     Row Name 18 1500             Subjective Comments    Subjective Comments Client reports very mild back pain today.  She reported some pain after standing for a while cooking.  -MM         Subjective Pain    Able to rate subjective pain? yes  -MM      Pre-Treatment Pain Level 3  -MM      Post-Treatment Pain Level 1  -MM         Exercise 1    Exercise Name 1 Continued exercises in clinical setting per flow sheet.  -MM      Time 1 20  -MM      Additional Comments Therapeutic exercise  -MM        User Key  (r) = Recorded By, (t) = Taken By, (c) = Cosigned By    Initials Name Provider Type    TONO Calderon, PT Physical Therapist                        Manual Rx (last 36 hours)      Manual Treatments     Row Name 18 1500             Manual Rx 1    Manual Rx 1 Location Bilateral lumbar region  -MM      Manual Rx 1 Type Provided soft tissue mobilization using ASTYM technique.  Client was positioned prone with moderate pressure used for ASTYM.  -MM      Manual Rx 1 Duration 10  -MM        User Key  (r) =  Recorded By, (t) = Taken By, (c) = Cosigned By    Initials Name Provider Type    MM Alverto Calderon, PT Physical Therapist                             Time Calculation:   Start Time: 1500  Total Timed Code Minutes- PT: 30 minute(s)    Therapy Charges for Today     Code Description Service Date Service Provider Modifiers Qty    60457412767  PT THER PROC EA 15 MIN 4/12/2018 Alverto Calderon, PT GP 1    [id number]  PT MANUAL THERAPY EA 15 MIN 4/12/2018 Alverto Calderon, PT GP 1                    Alverto Calderon, PT  4/12/2018

## 2018-04-17 ENCOUNTER — HOSPITAL ENCOUNTER (OUTPATIENT)
Dept: PHYSICAL THERAPY | Facility: HOSPITAL | Age: 58
Setting detail: THERAPIES SERIES
Discharge: HOME | End: 2018-04-17

## 2018-04-17 DIAGNOSIS — M54.42 ACUTE LEFT-SIDED LOW BACK PAIN WITH LEFT-SIDED SCIATICA: Primary | ICD-10-CM

## 2018-04-17 PROCEDURE — 97140 MANUAL THERAPY 1/> REGIONS: CPT

## 2018-04-17 PROCEDURE — 97110 THERAPEUTIC EXERCISES: CPT

## 2018-04-17 NOTE — THERAPY TREATMENT NOTE
Outpatient Physical Therapy Ortho Treatment Note  [facility]     Patient Name: Nancy Thomas  : 1960  MRN: [id number]  Today's Date: 2018      Visit Date: 2018    Visit Dx:    ICD-10-CM ICD-9-CM   1. Acute left-sided low back pain with left-sided sciatica M54.42 724.2     724.3       Past Medical History:   Diagnosis Date   • OA (osteoarthritis of spine)    • Osteoporosis    • Vitamin D deficiency                 PT Assessment/Plan     Row Name 18 1500          PT Assessment    Assessment Comments No complaints with treatment today.  Pain was better today.  She continues with mild left lumbar tenderness.   -MM        PT Plan    PT Plan Comments Continue per plan of treatment with exercises and manual therapy.  -MM       User Key  (r) = Recorded By, (t) = Taken By, (c) = Cosigned By    Initials Name Provider Type    TONO Calderon, PT Physical Therapist                    Exercises     Row Name 18 1500             Subjective Comments    Subjective Comments Client reports no back pain at the time of treatment today.  She reports she does have some left shoulder pain.  -MM         Subjective Pain    Able to rate subjective pain? yes  -MM      Pre-Treatment Pain Level 0  -MM      Post-Treatment Pain Level 0  -MM         Exercise 1    Exercise Name 1 Continued exercises in clinical setting per flow sheet.  Progressed exercises to include: Prone bilateral arm and leg raise, bicycle kicks, quadruped alternate arm and leg raise.  -MM      Time 1 20  -MM      Additional Comments Therapeutic exercise  -MM        User Key  (r) = Recorded By, (t) = Taken By, (c) = Cosigned By    Initials Name Provider Type    TONO Calderon, PT Physical Therapist             Manual Rx (last 36 hours)      Manual Treatments     Row Name 18 1500             Manual Rx 1    Manual Rx 1 Location Bilateral lumbar region  -MM      Manual Rx 1 Type Provided soft tissue mobilization using ASTYM  technique.  Client was positioned prone with moderate pressure used for ASTYM.  -MM      Manual Rx 1 Duration 10  -MM        User Key  (r) = Recorded By, (t) = Taken By, (c) = Cosigned By    Initials Name Provider Type    MM Alverto Calderon, PT Physical Therapist        Time Calculation:   Start Time: 1500  Total Timed Code Minutes- PT: 30 minute(s)    Therapy Charges for Today     Code Description Service Date Service Provider Modifiers Qty    72956079856  PT THER PROC EA 15 MIN 4/17/2018 Alverto Calderon, PT GP 1    [id number]  PT MANUAL THERAPY EA 15 MIN 4/17/2018 Alverto Calderon, PT GP 1                    Alverto Calderon, PT  4/17/2018

## 2018-04-19 ENCOUNTER — HOSPITAL ENCOUNTER (OUTPATIENT)
Dept: PHYSICAL THERAPY | Facility: HOSPITAL | Age: 58
Setting detail: THERAPIES SERIES
Discharge: HOME | End: 2018-04-19

## 2018-04-19 DIAGNOSIS — M54.42 ACUTE LEFT-SIDED LOW BACK PAIN WITH LEFT-SIDED SCIATICA: Primary | ICD-10-CM

## 2018-04-19 PROCEDURE — 97110 THERAPEUTIC EXERCISES: CPT

## 2018-04-19 PROCEDURE — 97140 MANUAL THERAPY 1/> REGIONS: CPT

## 2018-04-19 NOTE — THERAPY TREATMENT NOTE
Outpatient Physical Therapy Ortho Treatment Note  [facility]     Patient Name: Nancy Thomas  : 1960  MRN: [id number]  Today's Date: 2018      Visit Date: 2018    Visit Dx:    ICD-10-CM ICD-9-CM   1. Acute left-sided low back pain with left-sided sciatica M54.42 724.2     724.3        Past Medical History:   Diagnosis Date   • OA (osteoarthritis of spine)    • Osteoporosis    • Vitamin D deficiency                PT Assessment/Plan     Row Name 18 1500          PT Assessment    Assessment Comments Overall pain is improving.  once with exercises.  client reported mild tenderness right lumbar paraspinals.  -MM        PT Plan    PT Plan Comments Continue per plan of treatment with ASTYM and exercises.  -MM       User Key  (r) = Recorded By, (t) = Taken By, (c) = Cosigned By    Initials Name Provider Type    TONO Calderon, PT Physical Therapist                Exercises     Row Name 18 1500             Subjective Comments    Subjective Comments Client reports feeling better today and without pain. She reports that pain is intermittent now with daily activity.  -MM         Subjective Pain    Able to rate subjective pain? yes  -MM      Pre-Treatment Pain Level 0  -MM      Post-Treatment Pain Level 0  -MM         Exercise 1    Exercise Name 1 Continued exercises in clinical setting per flow sheet..  Progressed exercises to include: Planks, standing trunk rotation, squat and raise, and lower trunk rotation stretch.  -MM      Time 1 20  -MM      Additional Comments ther ex  -MM        User Key  (r) = Recorded By, (t) = Taken By, (c) = Cosigned By    Initials Name Provider Type    TONO Calderon, PT Physical Therapist             Manual Rx (last 36 hours)      Manual Treatments     Row Name 18 1500             Manual Rx 1    Manual Rx 1 Location Bilateral lumbar region  -MM      Manual Rx 1 Type Provided soft tissue mobilization using ASTYM technique.  Client was  positioned prone with moderate pressure used for ASTYM.  -MM      Manual Rx 1 Duration 10  -MM        User Key  (r) = Recorded By, (t) = Taken By, (c) = Cosigned By    Initials Name Provider Type    MM Alverto Calderon, PT Physical Therapist        Time Calculation:   Start Time: 1500  Total Timed Code Minutes- PT: 30 minute(s)    Therapy Charges for Today     Code Description Service Date Service Provider Modifiers Qty    46636100812  PT THER PROC EA 15 MIN 4/19/2018 Alverto Calderon, PT GP 1    [id number]  PT MANUAL THERAPY EA 15 MIN 4/19/2018 Alverto Calderon, PT GP 1                    Alverto Calderon, PT  4/19/2018

## 2018-04-26 ENCOUNTER — HOSPITAL ENCOUNTER (OUTPATIENT)
Dept: PHYSICAL THERAPY | Facility: HOSPITAL | Age: 58
Setting detail: THERAPIES SERIES
Discharge: HOME | End: 2018-04-26

## 2018-04-26 DIAGNOSIS — M54.42 ACUTE LEFT-SIDED LOW BACK PAIN WITH LEFT-SIDED SCIATICA: Primary | ICD-10-CM

## 2018-04-26 PROCEDURE — 97110 THERAPEUTIC EXERCISES: CPT

## 2018-04-26 PROCEDURE — 97140 MANUAL THERAPY 1/> REGIONS: CPT

## 2018-04-26 NOTE — THERAPY DISCHARGE NOTE
Outpatient Physical Therapy Ortho Treatment Note/Discharge Summary   [facility]     Patient Name: Nancy Thomas  : 1960  MRN: [id number]  Today's Date: 2018      Visit Date: 2018    Visit Dx:    ICD-10-CM ICD-9-CM   1. Acute left-sided low back pain with left-sided sciatica M54.42 724.2     724.3       Past Medical History:   Diagnosis Date   • OA (osteoarthritis of spine)    • Osteoporosis    • Vitamin D deficiency              PT Ortho     Row Name 18 1500       Posture/Observations    Posture/Observations Comments Palpation: Mild lower lumbar paravertebral tenderness.  -MM       Lumbar ROM Screen- Lower Quarter Clearing    Lumbar Flexion Normal  -MM    Lumbar Extension Impaired   20°  -MM    Lumbar Lateral Flexion Impaired   Right: 23°; left: 22°  -MM    Lumbar Rotation Impaired   37° bilateral  -MM       Left Hip (Manual Muscle Testing)    MMT, Left Hip: Manual Muscle Testing (MMT) Flexion: 5/5; extension: 4+/5; abduction: 5/5; 80 adduction: 5/5.  -MM       Right Hip (Manual Muscle Testing)    MMT, Right Hip: Manual Muscle Testing (MMT) Flexion: 5/5; extension: 4+/5; abduction: 5/5; 80 adduction: 5/5.  -MM       Left Knee (Manual Muscle Testing)    Comment, Left Knee: Manual Muscle Testing (MMT) Flexion: 4+/5; extension: 5/5.  -MM       Right Knee (Manual Muscle Testing)    Comment, Right Knee: Manual Muscle Testing (MMT) Flexion: 4+/5; extension: 5/5.  -MM       Left Ankle/Foot (Manual Muscle Testing)    Comment, MMT: Left Ankle/Foot Normal  -MM       Right Ankle/Foot (Manual Muscle Testing)    Comment, MMT: Right Ankle/Foot Normal  -MM      User Key  (r) = Recorded By, (t) = Taken By, (c) = Cosigned By    Initials Name Provider Type    MM Alverto Calderon, PT Physical Therapist                PT Assessment/Plan     Row Name 18 1500          PT Assessment    Assessment Comments Low back pain improved.  Client completed the current program.  She has a good understanding  of exercises to continue on her own.  She continues with very mild lumbar tenderness.  Modified Oswestry score improved from 46% to 20% disability.  Prognosis at discharge is good.  -MM     Please refer to paper survey for additional self-reported information Yes  -MM        PT Plan    PT Plan Comments Discharge due to completion of current program.  -MM       User Key  (r) = Recorded By, (t) = Taken By, (c) = Cosigned By    Initials Name Provider Type    TONO Calderon, PT Physical Therapist                    Exercises     Row Name 04/26/18 1540 04/26/18 1500          Subjective Comments    Subjective Comments --  -MM Client reports no pain at the time of treatment today.  Overall her back has been feeling much better.  She notes some soreness and tenderness that occurs intermittently and left lumbar paravertebrals.  -MM        Subjective Pain    Able to rate subjective pain?  -- yes  -MM     Pre-Treatment Pain Level  -- 0  -MM     Post-Treatment Pain Level  -- 0  -MM        Exercise 1    Exercise Name 1  -- Reviewed home program.  Exercises in the clinic included: NuStep crosstrainer, bridging, leg raise on all fours, abdominal set with arm and leg raise, small range curl up.  Time for updating objective measures is also included in therapeutic exercise.  -MM     Time 1  -- 20  -MM     Additional Comments  -- Ther ex  -MM       User Key  (r) = Recorded By, (t) = Taken By, (c) = Cosigned By    Initials Name Provider Type    TONO Calderon, PT Physical Therapist                        Manual Rx (last 36 hours)      Manual Treatments     Row Name 04/26/18 1500             Manual Rx 1    Manual Rx 1 Location Bilateral lumbar region  -MM      Manual Rx 1 Type Provided soft tissue mobilization using ASTYM technique.  Client was positioned prone with moderate pressure used for ASTYM.  -MM      Manual Rx 1 Duration 10  -MM        User Key  (r) = Recorded By, (t) = Taken By, (c) = Cosigned By    Initials Name  Provider Type    MM Alverto Calderon, PT Physical Therapist                PT OP Goals     Row Name 04/26/18 1500          PT Short Term Goals    STG Date to Achieve 04/18/18  -MM     STG 1 Client will report 75% improvement in low back pain.  -MM     STG 1 Progress Met  -MM     STG 2 Radicular left calf pain will resolve.  -MM     STG 2 Progress Met  -MM     STG 3 Lumbosacral tenderness will resolve.  -MM     STG 3 Progress Met  -MM     STG 4 Client will be independent with home program to minimize pain and improve overall strength and mobility.  -MM     STG 4 Progress Met  -MM        Long Term Goals    LTG Date to Achieve 04/25/18  -MM     LTG 1 Modified Oswestry score will improve to 16% or better.  -MM     LTG 1 Progress Not Met  -MM     LTG 1 Progress Comments improved from 42% to 22%  -MM     LTG 2 Client will return to lifting moderate weight from the floor to waist without difficulty.  -MM     LTG 2 Progress Met  -MM     LTG 3 Client will return to regular daily activity in the home without difficulty.  -MM     LTG 3 Progress Met  -MM       User Key  (r) = Recorded By, (t) = Taken By, (c) = Cosigned By    Initials Name Provider Type    MM Alverto Calderon, PT Physical Therapist               Outcome Measure Options: Modifed Owestry  Modified Oswestry  Modified Oswestry Score/Comments: 22%      Time Calculation:   Start Time: 1500  Total Timed Code Minutes- PT: 30 minute(s)    Therapy Charges for Today     Code Description Service Date Service Provider Modifiers Qty    43437996733 HC PT THER PROC EA 15 MIN 4/26/2018 Alverto Calderon, PT GP 1    18534344705 HC PT MANUAL THERAPY EA 15 MIN 4/26/2018 Alverto Calderon, PT GP 1          PT G-Codes  Outcome Measure Options: Modifed Owestry     OP PT Discharge Summary  Date of Discharge: 04/26/18  Reason for Discharge: Maximum functional potential achieved, Independent  Outcomes Achieved: Refer to plan of care for updates on goals achieved  Discharge Destination:  Home with home program      Alverto Calderon, PT  4/26/2018

## 2018-05-15 ENCOUNTER — TRANSCRIBE ORDERS (OUTPATIENT)
Dept: PHYSICAL THERAPY | Facility: HOSPITAL | Age: 58
End: 2018-05-15

## 2018-05-15 ENCOUNTER — HOSPITAL ENCOUNTER (OUTPATIENT)
Dept: PHYSICAL THERAPY | Facility: HOSPITAL | Age: 58
Setting detail: THERAPIES SERIES
Discharge: HOME | End: 2018-05-15

## 2018-05-15 DIAGNOSIS — M54.2 CERVICALGIA: Primary | ICD-10-CM

## 2018-05-15 DIAGNOSIS — M54.2 NECK PAIN: Primary | ICD-10-CM

## 2018-05-15 PROCEDURE — 97161 PT EVAL LOW COMPLEX 20 MIN: CPT

## 2018-05-16 NOTE — THERAPY EVALUATION
Outpatient Physical Therapy Ortho Initial Evaluation   [facility]     Patient Name: Nancy Thomas  : 1960  MRN: [id number]  Today's Date: 5/15/2018      Visit Date: 05/15/2018       Past Medical History:   Diagnosis Date   • OA (osteoarthritis of spine)    • Osteoporosis    • Vitamin D deficiency           Visit Dx:     ICD-10-CM ICD-9-CM   1. Neck pain M54.2 723.1             Patient History     Row Name 05/15/18 1345             History    Chief Complaint Difficulty with daily activities;Joint stiffness;Muscle tenderness;Pain  -MM      Type of Pain Neck pain   Bilateral upper trap, some pain into right arm and hand  -MM      Date Current Problem(s) Began 18  -MM      Brief Description of Current Complaint Client presents with neck and bilateral upper trap pain with frequent radiating pain to right arm and hand.  She reports symptoms have been intermittent for the past year.  She noticed worsening of symptoms last week.  She reports neck and upper trap pain increases with activities around the house and working at her laptop.  She also notices some pain early in the morning after waking up.  -MM      Patient/Caregiver Goals Relieve pain  -MM      Are you or can you be pregnant No  -MM         Pain     Pain Location Neck;Arm  -MM      Pain at Present 7  -MM      Pain at Best 2  -MM      Pain at Worst 7  -MM      Pain Frequency Intermittent  -MM      Pain Description Aching;Throbbing;Tender;Tightness;Tiring;Discomfort  -MM      Pain Comments No numbness or tingling reported.  Some arm pain and throbbing in the right hand at times.  Client also reports some mid upper thoracic pain.  -MM      Difficulties with ADL's? Housework, cleaning, working at laptop  -MM         Fall Risk Assessment    Any falls in the past year: No  -MM         Daily Activities    Primary Language Malay  -MM      Action taken if English not primary language Client also speaks English and prefers to have her   interpret if needed.  -MM      Are you able to read Yes  -MM      Are you able to write Yes  -MM      How does patient learn best? Demonstration  -MM      Barriers to learning Language   Per  comes with her and helps interpret if needed.    -MM      Action taken for identified issues After the initial visit client is going to be scheduled with a female therapist due to client recommendation for [employer] reasons and beginning fasting.  -MM      Pt Participated in POC and Goals Yes  -MM         Safety    Are you being hurt, hit, or frightened by anyone at home or in your life? No  -MM      Are you being neglected by a caregiver No  -MM        User Key  (r) = Recorded By, (t) = Taken By, (c) = Cosigned By    Initials Name Provider Type    MM Alverto Calderon, PT Physical Therapist                PT Ortho     Row Name 05/15/18 3985       Precautions and Contraindications    Precautions Reported osteoporosis  -MM    Contraindications none  -MM       Posture/Observations    Posture/Observations Comments Mild forward head, palpation: Moderate tenderness bilateral cervical paravertebrals, tenderness bilateral upper trap and levator scapula region.  Some tenderness also upper thoracic paravertebrals.  Triggerpoints noted bilateral upper trap and levator scapulae.  -MM       Myotomal Screen- Upper Quarter Clearing     --   Right: 16 pounds; left: 9 pounds  -MM       Cervical/Shoulder ROM Screen    Cervical flexion Normal   60°  -MM    Cervical extension Impaired   35°  -MM    Cervical lateral flexion Impaired   Right: 20°; left: 25°  -MM    Cervical rotation Impaired   Right: 53°; left: 47°.  Some pain after neck rotation either  -MM    Cervical quadrant (Spurling's) --   Some relief with cervical distraction  -MM    Shoulder elevation  Normal  -MM       General ROM    GENERAL ROM COMMENTS Client tolerates passive stretching without complaints.  She does have mild discomfort with cervical PA glides and grade 1-2  lateral glides throughout cervical spine.  Overall joint mobility is good throughout cervical spine.  Passive range is also good with passive rotation.  Primary complaint is bilateral upper trap and levator scapulae trigger points.  -MM       General Assessment (Manual Muscle Testing)    Comment, General Manual Muscle Testing (MMT) Assessment Bilateral elbow strength is within normal limits.  Shoulder elevation: 4/5 bilateral.  Shoulder extension: 5/5 bilateral.  -MM      User Key  (r) = Recorded By, (t) = Taken By, (c) = Cosigned By    Initials Name Provider Type    TONO Calderon, PT Physical Therapist                PT OP Goals     Row Name 05/15/18 1345          PT Short Term Goals    STG Date to Achieve 06/05/18  -MM     STG 1 Client will report 75% improvement in neck and upper trap pain with daily activity.  -MM     STG 1 Progress New  -MM     STG 2 Bilateral upper trap trigger points will improve to minimal.  -MM     STG 2 Progress New  -MM     STG 3 Client will be independent with exercises to maximize cervical mobility and strength.  -MM     STG 3 Progress New  -MM     STG 4 Radicular symptoms in her right arm will resolve.  -MM     STG 4 Progress New  -MM        Long Term Goals    LTG Date to Achieve 06/12/18  -MM     LTG 1 Neck disability score will improve to 8/45 or better.  -MM     LTG 1 Progress New  -MM     LTG 2 Active cervical rotation will improve to 60° bilateral.  -MM     LTG 2 Progress New  -MM     LTG 3 Bilateral  strength will improve to 20 pounds.  -MM     LTG 3 Progress New  -MM        Time Calculation    PT Goal Re-Cert Due Date 08/13/18  -MM       User Key  (r) = Recorded By, (t) = Taken By, (c) = Cosigned By    Initials Name Provider Type    TONO Calderon, PT Physical Therapist                PT Assessment/Plan     Row Name 05/15/18 1030          PT Assessment    Assessment Comments Client presents with evolving symptoms of low complexity.  Signs symptoms are consistent  with bilateral upper trap and levator scapulae pain related to muscle tightness and trigger points.  Client also has a history of osteoporosis and frequent joint pain in many locations.  She reports relief with ASTYM.  She also noted some relief with gentle manual traction today.  Overall shoulder elevation and  strength is weak bilateral.  Skilled intervention is required to improve mobility and strength and minimize pain.  Client should respond well to manual therapy and exercises.  -MM     Please refer to paper survey for additional self-reported information Yes  -MM     Rehab Potential Good  -MM     Patient/caregiver participated in establishment of treatment plan and goals Yes  -MM     Patient would benefit from skilled therapy intervention Yes  -MM        PT Plan    PT Frequency 2x/week  -MM     Predicted Duration of Therapy Intervention (OT Eval) 6-8 visits  -MM     Planned CPT's? PT EVAL LOW COMPLEXITY: 78284;PT THER PROC EA 15 MIN: 98592;PT MANUAL THERAPY EA 15 MIN: 10412;PT ELECTRICAL STIM UNATTEND: ;PT ULTRASOUND EA 15 MIN: 68234;PT HOT/COLD PACK WC NONMCARE: 38021  -MM     PT Plan Comments Continue per plan of treatment with exercises to maximize upper body strength and improve neck flexibility.  Also include postural exercises.  Include ASTYM and manual therapy to minimize muscle and joint pain.  -MM       User Key  (r) = Recorded By, (t) = Taken By, (c) = Cosigned By    Initials Name Provider Type    MM Alverto Calderon, PT Physical Therapist        Outcome Measure Options: Neck Disability Index (NDI)  Neck Disability Index  Section 1 - Pain Intensity: The pain is moderate at the moment.  Section 2 - Personal Care: I can look after myself normally, but it causes extra pain.  Section 3 - Lifting: I can lift only very light weights.  Section 4 - Work: I can only do my usual work, but no more  Section 5 - Headaches: I have slight headaches that come infrequently.  Section 6 - Concentration: I can  concentrate fully without difficulty.  Section 7 - Sleeping: I have no trouble sleeping.  Section 9 - Reading: I can read as much as I want with slight neck pain.  Section 10 - Recreation: I have some neck pain with all recreational activities.  Neck Disability Index Score: 11  Neck Disability Index Comments: 11/45      Time Calculation:   Start Time: 1345     Therapy Charges for Today     Code Description Service Date Service Provider Modifiers Qty    10443362873 HC PT EVAL LOW COMPLEXITY 4 5/15/2018 Alverto Calderon, PT GP 1          PT G-Codes  Outcome Measure Options: Neck Disability Index (NDI)         Alverto Calderon, PT  5/15/2018

## 2018-05-22 ENCOUNTER — HOSPITAL ENCOUNTER (OUTPATIENT)
Dept: PHYSICAL THERAPY | Facility: HOSPITAL | Age: 58
Setting detail: THERAPIES SERIES
Discharge: HOME | End: 2018-05-22

## 2018-05-22 DIAGNOSIS — M54.2 NECK PAIN: Primary | ICD-10-CM

## 2018-05-22 PROCEDURE — 97140 MANUAL THERAPY 1/> REGIONS: CPT

## 2018-05-22 PROCEDURE — 97110 THERAPEUTIC EXERCISES: CPT

## 2018-05-22 NOTE — THERAPY TREATMENT NOTE
Outpatient Physical Therapy Ortho Treatment Note  [facility]     Patient Name: Nancy Thomas  : 1960  MRN: [id number]  Today's Date: 2018      Visit Date: 2018    Visit Dx:    ICD-10-CM ICD-9-CM   1. Neck pain M54.2 723.1       There is no problem list on file for this patient.       Past Medical History:   Diagnosis Date   • OA (osteoarthritis of spine)    • Osteoporosis    • Vitamin D deficiency              PT Assessment/Plan     Row Name 18 1030          PT Assessment    Assessment Comments Patient with tightness and tenderness cervical, upper thoracic, scapular regions L>R.  Tolerates ASTYM well and some improvement noted  -LF        PT Plan    PT Plan Comments cont. per POC with postural exercises, and manual therapy including ASTYM  -LF       User Key  (r) = Recorded By, (t) = Taken By, (c) = Cosigned By    Initials Name Provider Type     Lynad Lisa, PT Physical Therapist                    Exercises     Row Name 18 1030             Subjective Comments    Subjective Comments c/o neck and upper arm pain.  tight all the time  -         Subjective Pain    Able to rate subjective pain? yes  -LF      Pre-Treatment Pain Level 7  -LF      Post-Treatment Pain Level 6  -LF         Exercise 1    Exercise Name 1 Ther ex per flowsheet in paper chart  -LF      Cueing 1 Verbal;Demo  -LF      Time 1 15  -LF        User Key  (r) = Recorded By, (t) = Taken By, (c) = Cosigned By    Initials Name Provider Type     Lynda Lisa, PT Physical Therapist                        Manual Rx (last 36 hours)      Manual Treatments     Row Name 18 1030             Manual Rx 1    Manual Rx 1 Location upper trap and upper thoracic regions  -LF      Manual Rx 1 Type ASTYM - patient positioned prone.  then STM at left upper trap and left thoracic p/s, and R shoulder and medial scap border  -      Manual Rx 1 Duration 10  -LF         Manual Rx 2    Manual Rx 2 Location Neck  -LF       Manual Rx 2 Type Gentle manual traction pulls.  Also suboccipital release, but stopped b/c pt c/o pain  -LF      Manual Rx 2 Duration 5  -LF        User Key  (r) = Recorded By, (t) = Taken By, (c) = Cosigned By    Initials Name Provider Type    LF Lynda Lisa, PT Physical Therapist        Time Calculation:   Start Time: 1030  Total Timed Code Minutes- PT: 30 minute(s)    Therapy Charges for Today     Code Description Service Date Service Provider Modifiers Qty    68916278568  PT MANUAL THERAPY EA 15 MIN 5/22/2018 Lynda Lisa, PT GP 1    [id number] HC PT THER PROC EA 15 MIN 5/22/2018 Lynda Lisa, PT GP 1                    Lynda Lisa, PT  5/22/2018

## 2018-06-04 ENCOUNTER — HOSPITAL ENCOUNTER (OUTPATIENT)
Dept: PHYSICAL THERAPY | Facility: HOSPITAL | Age: 58
Setting detail: THERAPIES SERIES
Discharge: HOME | End: 2018-06-04

## 2018-06-04 DIAGNOSIS — M54.2 NECK PAIN: Primary | ICD-10-CM

## 2018-06-04 PROCEDURE — 97110 THERAPEUTIC EXERCISES: CPT | Performed by: PHYSICAL THERAPIST

## 2018-06-04 PROCEDURE — 97140 MANUAL THERAPY 1/> REGIONS: CPT | Performed by: PHYSICAL THERAPIST

## 2018-06-04 NOTE — THERAPY TREATMENT NOTE
"    Outpatient Physical Therapy Ortho Treatment Note  [facility]     Patient Name: Nancy Thomas  : 1960  MRN: [id number]  Today's Date: 2018      Visit Date: 2018    Visit Dx:    ICD-10-CM ICD-9-CM   1. Neck pain M54.2 723.1       There is no problem list on file for this patient.       Past Medical History:   Diagnosis Date   • OA (osteoarthritis of spine)    • Osteoporosis    • Vitamin D deficiency         No past surgical history on file.            PT Assessment/Plan     Row Name 18 1300          PT Assessment    Assessment Comments Client reports complete pain relief post tx.  Educated on importance of completing ther ex daily.  -LM        PT Plan    PT Plan Comments Continue with PT POC focusing on strengthening/postural activities.  Continue with manual as needed.  -LM       User Key  (r) = Recorded By, (t) = Taken By, (c) = Cosigned By    Initials Name Provider Type    JOCELIN Hendrix PT Physical Therapist              Exercises     Row Name 18 1300             Subjective Comments    Subjective Comments Complains of \"cramping\" along the upper thoracic area medial to the shldr blade.  Also complains of UT tightness.  -LM         Subjective Pain    Able to rate subjective pain? yes  -LM      Pre-Treatment Pain Level 6  -LM      Post-Treatment Pain Level 0  -LM         Exercise 1    Exercise Name 1 Refer to flowsheet for ther ex completed in clinical setting,  -LM      Cueing 1 Verbal;Demo  -LM      Time 1 15 min  -LM      Additional Comments Ther Ex  -LM        User Key  (r) = Recorded By, (t) = Taken By, (c) = Cosigned By    Initials Name Provider Type    JOCELIN Hendrix PT Physical Therapist           Manual Rx (last 36 hours)      Manual Treatments     Row Name 18 1300             Manual Rx 1    Manual Rx 1 Location B Upper Trap/Upper Thoracic Paravertebrals  -LM      Manual Rx 1 Type Soft tissue mobilization using tools with moderate pressure.  Provided soft " tissue massage using deep pressure on tender points.  -LM      Manual Rx 1 Duration 10 min  -LM         Manual Rx 2    Manual Rx 2 Location Neck  -LM      Manual Rx 2 Type Gentle cervical traction with suboccipital release  -LM      Manual Rx 2 Duration 5 min  -LM        User Key  (r) = Recorded By, (t) = Taken By, (c) = Cosigned By    Initials Name Provider Type    LM Maribel Hendrix, PT Physical Therapist        Time Calculation:   Start Time: 1300  Total Timed Code Minutes- PT: 30 minute(s)    Therapy Charges for Today     Code Description Service Date Service Provider Modifiers Qty    32369772540  PT THER PROC EA 15 MIN 6/4/2018 Maribel Hendrix PT GP 1    [id number]  PT MANUAL THERAPY EA 15 MIN 6/4/2018 Maribel Hendrix PT GP 1        Maribel Hendrix PT  6/4/2018

## 2018-06-06 ENCOUNTER — HOSPITAL ENCOUNTER (OUTPATIENT)
Dept: PHYSICAL THERAPY | Facility: HOSPITAL | Age: 58
Setting detail: THERAPIES SERIES
Discharge: HOME | End: 2018-06-06

## 2018-06-06 DIAGNOSIS — M54.2 NECK PAIN: Primary | ICD-10-CM

## 2018-06-06 PROCEDURE — 97140 MANUAL THERAPY 1/> REGIONS: CPT | Performed by: PHYSICAL THERAPIST

## 2018-06-06 PROCEDURE — 97110 THERAPEUTIC EXERCISES: CPT | Performed by: PHYSICAL THERAPIST

## 2018-06-06 NOTE — THERAPY TREATMENT NOTE
Outpatient Physical Therapy Ortho Treatment Note  [facility]     Patient Name: Nancy Thomas  : 1960  MRN: [id number]  Today's Date: 2018      Visit Date: 2018    Visit Dx:    ICD-10-CM ICD-9-CM   1. Neck pain M54.2 723.1       There is no problem list on file for this patient.       Past Medical History:   Diagnosis Date   • OA (osteoarthritis of spine)    • Osteoporosis    • Vitamin D deficiency         No past surgical history on file.          Exercises     Row Name 18 1120             Subjective Comments    Subjective Comments States she is not having any pain on the R side today, but states it has moved over to the L side.  -LM         Subjective Pain    Able to rate subjective pain? yes  -LM      Pre-Treatment Pain Level 6  -LM      Post-Treatment Pain Level 2  -LM         Exercise 1    Exercise Name 1 Refer to flowsheet for ther ex completed in clinical setting.  -LM      Cueing 1 Verbal;Demo  -LM      Time 1 20 min  -LM      Additional Comments Ther Ex  -LM        User Key  (r) = Recorded By, (t) = Taken By, (c) = Cosigned By    Initials Name Provider Type    LM Maribel Hendrix PT Physical Therapist             Manual Rx (last 36 hours)      Manual Treatments     Row Name 18 1120             Manual Rx 1    Manual Rx 1 Location B Upper Trap/Upper-Mid Thoracic Paravertebrals  -LM      Manual Rx 1 Type Soft tissue mobilization using tools with moderate pressure.  Provided soft tissue massage using deep pressure on tender points.  -LM      Manual Rx 1 Duration 10 min  -LM        User Key  (r) = Recorded By, (t) = Taken By, (c) = Cosigned By    Initials Name Provider Type    LM Maribel Hendrix PT Physical Therapist        Time Calculation:   Start Time: 1120  Total Timed Code Minutes- PT: 30 minute(s)    Therapy Charges for Today     Code Description Service Date Service Provider Modifiers Qty    57677488519  PT THER PROC EA 15 MIN 2018 Maribel Hendrix PT GP 1     [id number]  PT MANUAL THERAPY EA 15 MIN 6/6/2018 Maribel Hendrix, PT GP 1        Maribel Hendrix, PT  6/6/2018

## 2018-06-11 ENCOUNTER — HOSPITAL ENCOUNTER (OUTPATIENT)
Dept: PHYSICAL THERAPY | Facility: HOSPITAL | Age: 58
Setting detail: THERAPIES SERIES
Discharge: HOME | End: 2018-06-11

## 2018-06-11 DIAGNOSIS — M54.2 NECK PAIN: Primary | ICD-10-CM

## 2018-06-11 PROCEDURE — 97110 THERAPEUTIC EXERCISES: CPT

## 2018-06-11 PROCEDURE — 97140 MANUAL THERAPY 1/> REGIONS: CPT

## 2018-06-11 NOTE — THERAPY TREATMENT NOTE
Outpatient Physical Therapy Ortho Treatment Note  [facility]     Patient Name: Nancy Thomas  : 1960  MRN: [id number]  Today's Date: 2018      Visit Date: 2018    Visit Dx:    ICD-10-CM ICD-9-CM   1. Neck pain M54.2 723.1       There is no problem list on file for this patient.       Past Medical History:   Diagnosis Date   • OA (osteoarthritis of spine)    • Osteoporosis    • Vitamin D deficiency               PT Assessment/Plan     Row Name 18 1300          PT Assessment    Assessment Comments Increased muscle tension R>L and improved with manual techniques, with decrease pain reported  -LF        PT Plan    PT Plan Comments cont with ther ex for flexibility and postural strength.  cont. manual/ASTYM  -LF       User Key  (r) = Recorded By, (t) = Taken By, (c) = Cosigned By    Initials Name Provider Type     Lynda Lisa, PT Physical Therapist                    Exercises     Row Name 18 1300             Subjective Comments    Subjective Comments c/o bilateral neck pain R>L.  Describes burning sensations and points to upper trap region  -LF         Subjective Pain    Able to rate subjective pain? yes  -LF      Pre-Treatment Pain Level 7  -LF      Post-Treatment Pain Level 3  -LF         Exercise 1    Exercise Name 1 Ther ex in clinical setting per flowsheet  -LF      Cueing 1 Verbal;Demo  -LF      Time 1 20 min  -LF      Additional Comments ther ex  -LF        User Key  (r) = Recorded By, (t) = Taken By, (c) = Cosigned By    Initials Name Provider Type     Lynda Lisa, PT Physical Therapist                 Manual Rx (last 36 hours)      Manual Treatments     Row Name 18 1300             Manual Rx 1    Manual Rx 1 Location B upper trap, cervical, and upper/mid thoracic  -LF      Manual Rx 1 Type STM including ASTYM technique with patient seated in massage chair.  Followed with soft-tissue stretching, bending techniques and deep pressure to TP right upper  trap, and right in lower-medial border of scapular.  Patient very tight and tender both of these arease  -LF      Manual Rx 1 Duration 12  -LF        User Key  (r) = Recorded By, (t) = Taken By, (c) = Cosigned By    Initials Name Provider Type    LF Lynda Lisa, PT Physical Therapist          Time Calculation:   Start Time: 1300  Total Timed Code Minutes- PT: 32 minute(s)    Therapy Charges for Today     Code Description Service Date Service Provider Modifiers Qty    55059995793  PT MANUAL THERAPY EA 15 MIN 6/11/2018 Lynda Lisa, PT GP 1    44467681098 HC PT THER PROC EA 15 MIN 6/11/2018 Lynda Lisa, PT GP 1                    Lynda Lisa, PT  6/11/2018

## 2018-06-13 ENCOUNTER — HOSPITAL ENCOUNTER (OUTPATIENT)
Dept: PHYSICAL THERAPY | Facility: HOSPITAL | Age: 58
Setting detail: THERAPIES SERIES
Discharge: HOME | End: 2018-06-13

## 2018-06-13 DIAGNOSIS — M54.2 NECK PAIN: Primary | ICD-10-CM

## 2018-06-13 PROCEDURE — 97140 MANUAL THERAPY 1/> REGIONS: CPT | Performed by: PHYSICAL THERAPIST

## 2018-06-13 NOTE — THERAPY PROGRESS REPORT/RE-CERT
Outpatient Physical Therapy Ortho Progress Note   [facility]     Patient Name: Nancy Thomas  : 1960  MRN: [id number]  Today's Date: 2018      Visit Date: 2018    Visit Dx:    ICD-10-CM ICD-9-CM   1. Neck pain M54.2 723.1       There is no problem list on file for this patient.       Past Medical History:   Diagnosis Date   • OA (osteoarthritis of spine)    • Osteoporosis    • Vitamin D deficiency         No past surgical history on file.          PT Ortho     Row Name 18 1125       Subjective Comments    Subjective Comments States her pain is on the R side today - still states it radiates down her arm.  Overall she thinks she is about the same.  -LM       Posture/Observations    Posture/Observations Comments Palpation: Continues to have trigger points along the medial border of the scapula and UT.  -LM       General ROM    Head/Neck/Trunk Neck Extension;Neck Flexion;Neck Lt Lateral Flexion;Neck Rt Lateral Flexion;Neck Lt Rotation;Neck Rt Rotation  -LM    GENERAL ROM COMMENTS B Shldr AROM WFL; No pain felt with cervical PROM.  -LM       Head/Neck/Trunk    Neck Extension AROM 31  -LM    Neck Flexion AROM 40  -LM    Neck Lt Lateral Flexion AROM 22  -LM    Neck Rt Lateral Flexion AROM 23  -LM    Neck Lt Rotation AROM 52  -LM    Neck Rt Rotation AROM 60  -LM        Strength Right    # Reps 3  -LM    Right Rung 2  -LM    Right  Test 1 12  -LM    Right  Test 2 12  -LM    Right  Test 3 9  -LM     Strength Average Right 11  -LM        Strength Left    # Reps 3  -LM    Left Rung 2  -LM    Left  Test 1 10  -LM    Left  Test 2 11  -LM    Left  Test 3 12  -LM     Strength Average Left 11  -LM       Hand  Strength     Strength Affected Side Bilateral  -LM      User Key  (r) = Recorded By, (t) = Taken By, (c) = Cosigned By    Initials Name Provider Type    JOCELIN Hendrix PT Physical Therapist              PT Assessment/Plan     Row Name 18  1125          PT Assessment    Functional Limitations Limitation in home management;Limitations in community activities;Performance in leisure activities  -LM     Impairments Pain;Muscle strength;Posture  -LM     Assessment Comments Client reported no pain after soft tissue mobilization - PT demonstrated how to complete trigger point release at home using SC.  Overall, client is progressing slowly.  She has currently partially met one long term goal and is progressing towards remaining goals.  Skilled PT continues to be warranted to decrease pain and return client to OF.  -LM     Please refer to paper survey for additional self-reported information Yes  -LM     Rehab Potential Good  -LM     Patient/caregiver participated in establishment of treatment plan and goals Yes  -LM     Patient would benefit from skilled therapy intervention Yes  -LM        PT Plan    PT Frequency 2x/week  -LM     Predicted Duration of Therapy Intervention (Therapy Eval) 8 visits  -LM     Planned CPT's? PT RE-EVAL: 94212;PT THER PROC EA 15 MIN: 75245;PT MANUAL THERAPY EA 15 MIN: 90940;PT ELECTRICAL STIM UNATTEND: ;PT ULTRASOUND EA 15 MIN: 05502;PT HOT/COLD PACK WC NONMCARE: 27999;PT IONTOPHORESIS EA 15 MIN: 00635;PT THER SUPP EA 15 MIN  -LM     PT Plan Comments Continue to focus on strengthening/stabilization as pt tolerates.  Manual/modalities as needed.  -LM       User Key  (r) = Recorded By, (t) = Taken By, (c) = Cosigned By    Initials Name Provider Type    JOCELIN Hendrix, PT Physical Therapist              Exercises     Row Name 06/13/18 1125             Subjective Comments    Subjective Comments States her pain is on the R side today - still states it radiates down her arm.  Overall she thinks she is about the same.  -LM         Subjective Pain    Able to rate subjective pain? yes  -LM      Pre-Treatment Pain Level 7  -LM      Post-Treatment Pain Level 0  -LM         Total Minutes    39925 - PT Manual Therapy Minutes 30  -LM          Exercise 1    Exercise Name 1 Refer to flowsheet for ther ex completed in clinical setting.  -LM        User Key  (r) = Recorded By, (t) = Taken By, (c) = Cosigned By    Initials Name Provider Type    JOCELIN Hendrix, PT Physical Therapist           Manual Rx (last 36 hours)      Manual Treatments     Row Name 06/13/18 1125             Total Minutes    59685 - PT Manual Therapy Minutes 30  -LM         Manual Rx 1    Manual Rx 1 Location R UT/Cervical/Upper-Mid Thoracic Areas  -LM      Manual Rx 1 Type Soft tissue mobilization using tools with moderate pressure.  Then provided soft tissue massage with deep pressure to trigger points.  -LM         Manual Rx 2    Manual Rx 2 Location Neck  -LM      Manual Rx 2 Type Gentle cervical distraction with suboccipital release  -LM      Manual Rx 2 Duration Measurements for progress note included in manual therapy chargel  -LM        User Key  (r) = Recorded By, (t) = Taken By, (c) = Cosigned By    Initials Name Provider Type    JOCELIN Hendrix, PT Physical Therapist              PT OP Goals     Row Name 06/13/18 1125          PT Short Term Goals    STG Date to Achieve 06/05/18  -LM     STG 1 Client will report 75% improvement in neck and upper trap pain with daily activity.  -LM     STG 1 Progress Ongoing;Progressing  -LM     STG 2 Bilateral upper trap trigger points will improve to minimal.  -LM     STG 2 Progress Ongoing;Progressing  -LM     STG 3 Client will be independent with exercises to maximize cervical mobility and strength.  -LM     STG 3 Progress Ongoing;Progressing  -LM     STG 4 Radicular symptoms in her right arm will resolve.  -LM     STG 4 Progress Ongoing;Progressing  -LM        Long Term Goals    LTG Date to Achieve 06/12/18  -LM     LTG 1 Neck disability score will improve to 8/45 or better.  -LM     LTG 1 Progress Ongoing;Progressing  -LM     LTG 2 Active cervical rotation will improve to 60° bilateral.  -LM     LTG 2 Progress Partially Met  -LM      LTG 2 Progress Comments Met to the R side; 52 degrees on the L side  -LM     LTG 3 Bilateral  strength will improve to 20 pounds.  -LM     LTG 3 Progress Ongoing;Progressing  -LM        Time Calculation    PT Goal Re-Cert Due Date 08/13/18  -LM       User Key  (r) = Recorded By, (t) = Taken By, (c) = Cosigned By    Initials Name Provider Type    JOCELIN Hendrix PT Physical Therapist        Outcome Measure Options: Neck Disability Index (NDI)  Neck Disability Index  Section 1 - Pain Intensity: The pain is moderate at the moment.  Section 2 - Personal Care: I can look after myself normally, but it causes extra pain.  Section 3 - Lifting: I can lift heavy weights, but it gives me extra pain.  Section 4 - Work: I can only do my usual work, but no more  Section 5 - Headaches: I have moderate headaches that come infrequently.  Section 6 - Concentration: I have a fair degree of difficulty concentrating.  Section 7 - Sleeping: My sleep is slightly disturbed for less than 1 hour.  Section 9 - Reading: I can read as much as I want with slight neck pain.  Section 10 - Recreation: I have some neck pain with all recreational activities.  Neck Disability Index Score: 12  Neck Disability Index Comments: 12/45      Time Calculation:   Start Time: 1125  Therapy Suggested Charges     Code   Minutes Charges    73109 (CPT®) Hc Pt Neuromusc Re Education Ea 15 Min      22808 (CPT®) Hc Pt Ther Proc Ea 15 Min      59291 (CPT®) Hc Gait Training Ea 15 Min      96920 (CPT®) Hc Pt Therapeutic Act Ea 15 Min      30805 (CPT®) Hc Pt Manual Therapy Ea 15 Min 30 2    42093 (CPT®) Hc Pt Ther Massage- Per 15 Min      14542 (CPT®) Hc Pt Iontophoresis Ea 15 Min      67064 (CPT®) Hc Pt Elec Stim Ea-Per 15 Min      99542 (CPT®) Hc Pt Ultrasound Ea 15 Min      86144 (CPT®) Hc Pt Self Care/Mgmt/Train Ea 15 Min      Total  30 2        Therapy Charges for Today     Code Description Service Date Service Provider Modifiers Qty    57107714861 HC PT MANUAL  THERAPY EA 15 MIN 6/13/2018 Maribel Hendrix, PT GP 2          PT G-Codes  Outcome Measure Options: Neck Disability Index (NDI)     Maribel Hendrix, PT  6/13/2018

## 2018-06-19 ENCOUNTER — HOSPITAL ENCOUNTER (OUTPATIENT)
Dept: PHYSICAL THERAPY | Facility: HOSPITAL | Age: 58
Setting detail: THERAPIES SERIES
Discharge: HOME | End: 2018-06-19

## 2018-06-19 DIAGNOSIS — M54.2 NECK PAIN: Primary | ICD-10-CM

## 2018-06-19 PROCEDURE — 97110 THERAPEUTIC EXERCISES: CPT | Performed by: PHYSICAL THERAPIST

## 2018-06-19 PROCEDURE — 97140 MANUAL THERAPY 1/> REGIONS: CPT | Performed by: PHYSICAL THERAPIST

## 2018-06-19 NOTE — THERAPY TREATMENT NOTE
Outpatient Physical Therapy Ortho Treatment Note  [facility]     Patient Name: Nancy Thomas  : 1960  MRN: [id number]  Today's Date: 2018      Visit Date: 2018    Visit Dx:    ICD-10-CM ICD-9-CM   1. Neck pain M54.2 723.1       There is no problem list on file for this patient.       Past Medical History:   Diagnosis Date   • OA (osteoarthritis of spine)    • Osteoporosis    • Vitamin D deficiency         No past surgical history on file.            PT Assessment/Plan     Row Name 18 1300          PT Assessment    Assessment Comments Reports her UT/scapular pain is good but having a migraine today.  Manual provides much relief.  -LM        PT Plan    PT Plan Comments Continue to focus on strengthening/stabilization as pt tolerates.  Manual/modalities as needed.  -LM       User Key  (r) = Recorded By, (t) = Taken By, (c) = Cosigned By    Initials Name Provider Type    JOCELIN Hendrix, LIONEL Physical Therapist              Exercises     Row Name 18 1300             Subjective Comments    Subjective Comments States her pain is better in the UT and scapular area   -LM         Subjective Pain    Able to rate subjective pain? yes  -LM      Pre-Treatment Pain Level 4  -LM      Post-Treatment Pain Level 1  -LM         Total Minutes    73629 - PT Therapeutic Exercise Minutes 10  -LM      03500 - PT Manual Therapy Minutes 20  -LM         Exercise 1    Exercise Name 1 Refer to flowsheet for ther ex completed in clinical setting.  -LM      Cueing 1 Verbal  -LM        User Key  (r) = Recorded By, (t) = Taken By, (c) = Cosigned By    Initials Name Provider Type    JOCELIN eHndrix PT Physical Therapist           Manual Rx (last 36 hours)      Manual Treatments     Row Name 18 1300             Total Minutes    87925 - PT Manual Therapy Minutes 20  -LM         Manual Rx 1    Manual Rx 1 Location R UT/Cervical/Upper-Mid Thoracic Paravertebrals  -LM      Manual Rx 1 Type Soft tissue  mobilization using tools with moderate pressure.  Then provided deep pressure to tender points.  -LM         Manual Rx 2    Manual Rx 2 Location Neck  -LM      Manual Rx 2 Type Gentle cervical distraction with suboccipital release; PA glides cervical facets  -LM        User Key  (r) = Recorded By, (t) = Taken By, (c) = Cosigned By    Initials Name Provider Type    LM Maribel Hendrix, PT Physical Therapist        Time Calculation:   Start Time: 1300  Therapy Suggested Charges     Code   Minutes Charges    60644 (CPT®) Hc Pt Neuromusc Re Education Ea 15 Min      43244 (CPT®) Hc Pt Ther Proc Ea 15 Min 10 1    91370 (CPT®) Hc Gait Training Ea 15 Min      29090 (CPT®) Hc Pt Therapeutic Act Ea 15 Min      16369 (CPT®) Hc Pt Manual Therapy Ea 15 Min 20 1    35638 (CPT®) Hc Pt Ther Massage- Per 15 Min      64327 (CPT®) Hc Pt Iontophoresis Ea 15 Min      50713 (CPT®) Hc Pt Elec Stim Ea-Per 15 Min      32800 (CPT®) Hc Pt Ultrasound Ea 15 Min      29644 (CPT®) Hc Pt Self Care/Mgmt/Train Ea 15 Min      Total  30 2        Therapy Charges for Today     Code Description Service Date Service Provider Modifiers Qty    05084269403 HC PT THER PROC EA 15 MIN 6/19/2018 Maribel Hendrix, PT GP 1    60516003895 HC PT MANUAL THERAPY EA 15 MIN 6/19/2018 Maribel Hendrix, PT GP 1        Maribel Hendrix, PT  6/19/2018

## 2018-06-20 ENCOUNTER — HOSPITAL ENCOUNTER (OUTPATIENT)
Dept: PHYSICAL THERAPY | Facility: HOSPITAL | Age: 58
Setting detail: THERAPIES SERIES
Discharge: HOME | End: 2018-06-20

## 2018-06-20 DIAGNOSIS — M54.2 NECK PAIN: Primary | ICD-10-CM

## 2018-06-20 PROCEDURE — 97140 MANUAL THERAPY 1/> REGIONS: CPT | Performed by: PHYSICAL THERAPIST

## 2018-06-20 PROCEDURE — 97110 THERAPEUTIC EXERCISES: CPT | Performed by: PHYSICAL THERAPIST

## 2018-06-20 NOTE — THERAPY TREATMENT NOTE
Outpatient Physical Therapy Ortho Treatment Note  [facility]     Patient Name: Nancy Thomas  : 1960  MRN: [id number]  Today's Date: 2018      Visit Date: 2018    Visit Dx:    ICD-10-CM ICD-9-CM   1. Neck pain M54.2 723.1       There is no problem list on file for this patient.       Past Medical History:   Diagnosis Date   • OA (osteoarthritis of spine)    • Osteoporosis    • Vitamin D deficiency         No past surgical history on file.          PT Assessment/Plan     Row Name 18 1120          PT Assessment    Assessment Comments Client feeling pretty well today.  States her only pain is in her fingers.  Manual provides full relief.  -LM        PT Plan    PT Plan Comments Continue to work on flexibility/strengthening as pt tolerates.  Manual/modalities as needed.  -LM       User Key  (r) = Recorded By, (t) = Taken By, (c) = Cosigned By    Initials Name Provider Type    JOCELIN Hendrix PT Physical Therapist              Exercises     Row Name 18 1120             Subjective Comments    Subjective Comments Client reports she is doing well today - not having any pain with the exception of her fingers are bothering her a little bit.  -LM         Subjective Pain    Able to rate subjective pain? yes  -LM      Pre-Treatment Pain Level 0  -LM      Post-Treatment Pain Level 0  -LM         Total Minutes    38207 - PT Therapeutic Exercise Minutes 15  -LM      82141 - PT Manual Therapy Minutes 15  -LM         Exercise 1    Exercise Name 1 Refer to flowsheet for ther ex completed in clinical setting.  -LM      Cueing 1 Verbal  -LM        User Key  (r) = Recorded By, (t) = Taken By, (c) = Cosigned By    Initials Name Provider Type    JOCELIN Hendrix PT Physical Therapist           Manual Rx (last 36 hours)      Manual Treatments     Row Name 18 1120 18 1300          Total Minutes    00597 - PT Manual Therapy Minutes 15  -LM 20  -LM        Manual Rx 1    Manual Rx 1  Location R UT/Cervical/Upper-Mid Thoracic Paravertebrals  -LM R UT/Cervical/Upper-Mid Thoracic Paravertebrals  -LM     Manual Rx 1 Type Soft tissue mobilization using tools with moderate pressure.  Then provided deep pressure to tender points.  -LM Soft tissue mobilization using tools with moderate pressure.  Then provided deep pressure to tender points.  -LM        Manual Rx 2    Manual Rx 2 Location Neck  -LM Neck  -LM     Manual Rx 2 Type Gentle cervical distraction with suboccipital release; B PA Glides; R UT Stretch  -LM Gentle cervical distraction with suboccipital release; PA glides cervical facets  -LM       User Key  (r) = Recorded By, (t) = Taken By, (c) = Cosigned By    Initials Name Provider Type    LM Maribel Hendrix, PT Physical Therapist        Time Calculation:   Start Time: 1120  Therapy Suggested Charges     Code   Minutes Charges    18088 (CPT®) Hc Pt Neuromusc Re Education Ea 15 Min      58992 (CPT®) Hc Pt Ther Proc Ea 15 Min 15 1    52496 (CPT®) Hc Gait Training Ea 15 Min      69278 (CPT®) Hc Pt Therapeutic Act Ea 15 Min      84329 (CPT®) Hc Pt Manual Therapy Ea 15 Min 15 1    27947 (CPT®) Hc Pt Ther Massage- Per 15 Min      25959 (CPT®) Hc Pt Iontophoresis Ea 15 Min      57124 (CPT®) Hc Pt Elec Stim Ea-Per 15 Min      97416 (CPT®) Hc Pt Ultrasound Ea 15 Min      01182 (CPT®) Hc Pt Self Care/Mgmt/Train Ea 15 Min      Total  30 2        Therapy Charges for Today     Code Description Service Date Service Provider Modifiers Qty    00272943721 HC PT THER PROC EA 15 MIN 6/20/2018 Maribel Hendrix, PT GP 1    [id number] HC PT MANUAL THERAPY EA 15 MIN 6/20/2018 Maribel Hendrix, PT GP 1        Maribel Hendrix PT  6/20/2018

## 2018-06-27 ENCOUNTER — HOSPITAL ENCOUNTER (OUTPATIENT)
Dept: PHYSICAL THERAPY | Facility: HOSPITAL | Age: 58
Setting detail: THERAPIES SERIES
Discharge: HOME | End: 2018-06-27

## 2018-06-27 DIAGNOSIS — M54.2 NECK PAIN: Primary | ICD-10-CM

## 2018-06-27 PROCEDURE — 97110 THERAPEUTIC EXERCISES: CPT | Performed by: PHYSICAL THERAPIST

## 2018-06-27 PROCEDURE — 97140 MANUAL THERAPY 1/> REGIONS: CPT | Performed by: PHYSICAL THERAPIST

## 2018-06-27 NOTE — THERAPY DISCHARGE NOTE
Outpatient Physical Therapy Ortho Treatment Note/Discharge Summary   [facility]     Patient Name: Nancy Thomas  : 1960  MRN: [id number]  Today's Date: 2018      Visit Date: 2018    Visit Dx:    ICD-10-CM ICD-9-CM   1. Neck pain M54.2 723.1       There is no problem list on file for this patient.       Past Medical History:   Diagnosis Date   • OA (osteoarthritis of spine)    • Osteoporosis    • Vitamin D deficiency         No past surgical history on file.          PT Assessment/Plan     Row Name 18 1130          PT Assessment    Assessment Comments Client met 3/4 short term goals and no long term goals.  Client's pain has significantly improved, with the only remaining pain in the R hand.  Client is traveling home to see her father for 4 months - so will therefore discharge from skilled PT at this time.  -LM        PT Plan    PT Plan Comments D/c skilled PT  -LM       User Key  (r) = Recorded By, (t) = Taken By, (c) = Cosigned By    Initials Name Provider Type    JOCELIN Hendrix, PT Physical Therapist              Exercises     Row Name 18 1130             Subjective Comments    Subjective Comments Client reports she is doing well.  States her only pain is in her hand.  -LM         Subjective Pain    Able to rate subjective pain? yes  -LM      Pre-Treatment Pain Level 0  -LM      Post-Treatment Pain Level 0  -LM         Total Minutes    70907 - PT Therapeutic Exercise Minutes 15  -LM      88875 - PT Manual Therapy Minutes 15  -LM         Exercise 1    Exercise Name 1 Refer to flowsheet for ther ex completed in clinical setting  -LM      Cueing 1 Verbal  -LM        User Key  (r) = Recorded By, (t) = Taken By, (c) = Cosigned By    Initials Name Provider Type    JOCELIN Hendrix PT Physical Therapist           Manual Rx (last 36 hours)      Manual Treatments     Row Name 18 1130             Total Minutes    79653 - PT Manual Therapy Minutes 15  -LM         Manual Rx 1     Manual Rx 1 Location R UT/Cervical/Upper-Mid Thoracic Paravertebrals  -LM      Manual Rx 1 Type Soft tissue mobilization using tools with moderate pressure.  Then provided deep pressure to tender points.  -LM         Manual Rx 2    Manual Rx 2 Location Neck  -LM      Manual Rx 2 Type Gentle cervical distraction with suboccipital release; B PA Glides; R UT Stretch  -LM        User Key  (r) = Recorded By, (t) = Taken By, (c) = Cosigned By    Initials Name Provider Type    JOCELIN Hendrix PT Physical Therapist              PT OP Goals     Row Name 06/27/18 1130          PT Short Term Goals    STG Date to Achieve 06/05/18  -LM     STG 1 Client will report 75% improvement in neck and upper trap pain with daily activity.  -LM     STG 1 Progress Met  -LM     STG 2 Bilateral upper trap trigger points will improve to minimal.  -LM     STG 2 Progress Met  -LM     STG 3 Client will be independent with exercises to maximize cervical mobility and strength.  -LM     STG 3 Progress Met  -LM     STG 4 Radicular symptoms in her right arm will resolve.  -LM     STG 4 Progress Not Met  -LM        Long Term Goals    LTG Date to Achieve 06/12/18  -LM     LTG 1 Neck disability score will improve to 8/45 or better.  -LM     LTG 1 Progress Not Met  -LM     LTG 2 Active cervical rotation will improve to 60° bilateral.  -LM     LTG 2 Progress Not Met  -LM     LTG 3 Bilateral  strength will improve to 20 pounds.  -LM     LTG 3 Progress Not Met  -LM        Time Calculation    PT Goal Re-Cert Due Date 08/03/18  -LM       User Key  (r) = Recorded By, (t) = Taken By, (c) = Cosigned By    Initials Name Provider Type    JOCELIN Hendrix PT Physical Therapist        Time Calculation:   Start Time: 1130  Therapy Suggested Charges     Code   Minutes Charges    76444 (CPT®) Hc Pt Neuromusc Re Education Ea 15 Min      27380 (CPT®) Hc Pt Ther Proc Ea 15 Min 15 1    10899 (CPT®) Hc Gait Training Ea 15 Min      08771 (CPT®) Hc Pt Therapeutic Act Ea  15 Min      53993 (CPT®) Hc Pt Manual Therapy Ea 15 Min 15 1    94539 (CPT®) Hc Pt Ther Massage- Per 15 Min      60692 (CPT®) Hc Pt Iontophoresis Ea 15 Min      92471 (CPT®) Hc Pt Elec Stim Ea-Per 15 Min      08311 (CPT®) Hc Pt Ultrasound Ea 15 Min      49912 (CPT®) Hc Pt Self Care/Mgmt/Train Ea 15 Min      Total  30 2        Therapy Charges for Today     Code Description Service Date Service Provider Modifiers Qty    29381996680 HC PT THER PROC EA 15 MIN 6/27/2018 Maribel Hendrix, PT GP 1    66382217915 HC PT MANUAL THERAPY EA 15 MIN 6/27/2018 Maribel Hendrix, PT GP 1        OP PT Discharge Summary  Date of Discharge: 06/27/18  Reason for Discharge: Maximum functional potential achieved, Independent, other (comment) ([place]  Outcomes Achieved: Refer to plan of care for updates on goals achieved  Discharge Destination: Home with home program      Maribel Hendrix PT  6/27/2018

## 2020-01-29 ENCOUNTER — LAB REQUISITION (OUTPATIENT)
Dept: LAB | Facility: HOSPITAL | Age: 60
End: 2020-01-29

## 2020-01-29 DIAGNOSIS — Z00.00 ROUTINE GENERAL MEDICAL EXAMINATION AT A HEALTH CARE FACILITY: ICD-10-CM

## 2020-01-29 PROCEDURE — 86481 TB AG RESPONSE T-CELL SUSP: CPT

## 2020-01-31 LAB
TSPOT INTERPRETATION: NEGATIVE
TSPOT NIL CONTROL INTERPRETATION: NORMAL
TSPOT PANEL A: 0
TSPOT PANEL B: 0
TSPOT POS CONTROL INTERPRETATION: NORMAL

## 2021-07-19 ENCOUNTER — TRANSCRIBE ORDERS (OUTPATIENT)
Dept: PHYSICAL THERAPY | Facility: HOSPITAL | Age: 61
End: 2021-07-19

## 2021-07-19 DIAGNOSIS — M54.50 LUMBAR SPINE PAIN: Primary | ICD-10-CM

## 2021-07-27 ENCOUNTER — HOSPITAL ENCOUNTER (OUTPATIENT)
Dept: PHYSICAL THERAPY | Facility: HOSPITAL | Age: 61
Setting detail: THERAPIES SERIES
Discharge: HOME | End: 2021-07-27

## 2021-07-27 DIAGNOSIS — M54.50 CHRONIC LEFT-SIDED LOW BACK PAIN WITHOUT SCIATICA: Primary | ICD-10-CM

## 2021-07-27 DIAGNOSIS — Z74.09 IMPAIRED FUNCTIONAL MOBILITY AND ACTIVITY TOLERANCE: ICD-10-CM

## 2021-07-27 DIAGNOSIS — G89.29 CHRONIC LEFT-SIDED LOW BACK PAIN WITHOUT SCIATICA: Primary | ICD-10-CM

## 2021-07-27 PROCEDURE — 97161 PT EVAL LOW COMPLEX 20 MIN: CPT

## 2021-07-27 NOTE — THERAPY EVALUATION
Outpatient Physical Therapy Ortho Initial Evaluation   [facility]     Patient Name: Nancy Galvan  : 1960  MRN: [id number]  Today's Date: 2021      Visit Date: 2021     Past Medical History:   Diagnosis Date   • OA (osteoarthritis of spine)    • Osteoporosis    • Vitamin D deficiency         Past Surgical History:   Procedure Laterality Date   •  SECTION     • EYE SURGERY         Visit Dx:     ICD-10-CM ICD-9-CM   1. Chronic left-sided low back pain without sciatica  M54.5 724.2    G89.29 338.29   2. Impaired functional mobility and activity tolerance  Z74.09 V49.89       Patient History     Row Name 21 1300             History    Chief Complaint  Pain  -MM (r) WL (t) MM (c)      Type of Pain  Back pain  -MM (r) WL (t) MM (c)      Date Current Problem(s) Began  18  -MM (r) WL (t) MM (c)      Brief Description of Current Complaint  Pain is localized to low back and left hip. Client has pain in both knees as well. Client has been dealing with back pain for around 30 years. Left greater than right.  -MM (r) WL (t) MM (c)      Patient/Caregiver Goals  Relieve pain;Improve mobility;Improve strength;Know what to do to help the symptoms  -MM (r) WL (t) MM (c)      Patient/Caregiver Goals Comment  be able to exercise without pain  -MM (r) WL (t) MM (c)      What clinical tests have you had for this problem?  X-ray  -MM (r) WL (t) MM (c)      Results of Clinical Tests  negative for fracture  -MM (r) WL (t) MM (c)         Pain     Pain Location  Back  -MM (r) WL (t) MM (c)      Pain at Present  4  -MM (r) WL (t) MM (c)      Pain at Best  6  -MM (r) WL (t) MM (c)      Pain at Worst  1  -MM (r) WL (t) MM (c)      Pain Frequency  Intermittent  -MM (r) WL (t) MM (c)      Pain Description  Burning;Sharp  -MM (r) WL (t) MM (c)      What Performance Factors Make the Current Problem(s) WORSE?  jumping, lifting, wrong sleeping or sitting position  -MM (r) WL (t) MM (c)      Tolerance  Time- Standing  1 hour  -MM (r) WL (t) MM (c)      Tolerance Time- Sitting  1 hour  -MM (r) WL (t) MM (c)      Is your sleep disturbed?  Yes  -MM (r) WL (t) MM (c)      What position do you sleep in?  Supine  -MM (r) WL (t) MM (c)      Difficulties with ADL's?  lifting  -MM (r) WL (t) MM (c)      Difficulties with recreational activities?  sport, exercise  -MM (r) WL (t) MM (c)         Fall Risk Assessment    Any falls in the past year:  No  -MM (r) WL (t) MM (c)         Daily Activities    Action taken if English not primary language   accompanies her to translate  -MM (r) WL (t) MM (c)      Barriers to learning  None  -MM (r) WL (t) MM (c)      Pt Participated in POC and Goals  Yes  -MM (r) WL (t) MM (c)        User Key  (r) = Recorded By, (t) = Taken By, (c) = Cosigned By    Initials Name Provider Type    MM Alverto Calderon, PT Physical Therapist    Jenaro Rosa, PT Student PT Student        PT Ortho     Row Name 07/27/21 1300       Subjective Comments    Subjective Comments  Pain is localized to low back and left hip. Client has pain in both knees as well. Client has been dealing with back pain for around 30 years. Left greater than right.  -MM (r) WL (t) MM (c)       Precautions and Contraindications    Precautions/Limitations  no known precautions/limitations  -MM (r) WL (t) MM (c)       Subjective Pain    Able to rate subjective pain?  yes  -MM (r) WL (t) MM (c)    Pre-Treatment Pain Level  4  -MM (r) WL (t) MM (c)    Post-Treatment Pain Level  3  -MM (r) WL (t) MM (c)       Posture/Observations    Posture/Observations Comments  Posture: stooped sitting posture, forward head, and decreased lumbar lordosis. Palpation: mod TTP from T11-L5, left PSIS, and proximal gluteals.  -MM (r) WL (t) MM (c)       Head/Neck/Trunk    Trunk Extension AROM  standing 19 w pain in L lumbar  -MM (r) WL (t) MM (c)    Trunk Flexion AROM  WNL  -MM (r) WL (t) MM (c)    Trunk Lt Lateral Flexion AROM  standing 15 w  pain, 16 after pressups  -MM (r) WL (t) MM (c)    Trunk Rt Lateral Flexion AROM  min deficits  -MM (r) WL (t) MM (c)    Trunk Lt Rotation AROM  WNL  -MM (r) WL (t) MM (c)    Trunk Rt Rotation AROM  WNL w pain  -MM (r) WL (t) MM (c)       MMT (Manual Muscle Testing)    General MMT Comments  BLE MMT 5/5 except where noted  -MM (r) WL (t) MM (c)       MMT Right Lower Ext    Rt Hip Flexion MMT, Gross Movement  (5/5) normal w sharp pain 5/10  -MM (r) WL (t) MM (c)    Rt Hip Extension MMT, Gross Movement  (4/5) good w pain  -MM (r) WL (t) MM (c)       MMT Left Lower Ext    Lt Hip Extension MMT, Gross Movement  (4/5) good w mod pain  -MM (r) WL (t) MM (c)       Sensation    Sensation WNL?  WNL  -MM (r) WL (t) MM (c)      User Key  (r) = Recorded By, (t) = Taken By, (c) = Cosigned By    Initials Name Provider Type    MM Alverto Calderon, PT Physical Therapist    WL Jenaro Ovalle, PT Student PT Student        Therapy Education  Education Details: HEP includes: prone pressups, prone hip extension, superman, double knee to chest  Given: HEP, Symptoms/condition management, Pain management  Program: New  How Provided: Verbal  Provided to: Patient  Level of Understanding: Teach back education performed     PT OP Goals     Row Name 07/27/21 1300          PT Short Term Goals    STG Date to Achieve  08/17/21  -MM (r) WL (t) MM (c)     STG 1  Client will perform pressups 10x without pain for improved functional mobility.  -MM (r) WL (t) MM (c)     STG 1 Progress  New  -MM (r) WL (t) MM (c)     STG 2  Client will improve score on Modified Owestry to 15% or better for improved activity tolerance.  -MM (r) WL (t) MM (c)     STG 2 Progress  New  -MM (r) WL (t) MM (c)     STG 3  Client will improve hip strength to 4+/5 or better with MMT for improved strength.  -MM (r) WL (t) MM (c)     STG 3 Progress  New  -MM (r) WL (t) MM (c)        Long Term Goals    LTG Date to Achieve  09/07/21  -MM (r) WL (t) MM (c)     LTG 1  Client will  perform sit to stand 5x without pain for improved functional mobility.  -MM (r) WL (t) MM (c)     LTG 1 Progress  New  -MM (r) WL (t) MM (c)     LTG 2  Client will improve score on Modified Owestry to 8% or better for improved activity tolerance.  -MM (r) WL (t) MM (c)     LTG 2 Progress  New  -MM (r) WL (t) MM (c)     LTG 3  Client will be independent with HEP for maximum potential benefit from skilled PT services.  -MM (r) WL (t) MM (c)     LTG 3 Progress  New  -MM (r) WL (t) MM (c)        Time Calculation    PT Goal Re-Cert Due Date  10/25/21  -MM (r) WL (t) MM (c)       User Key  (r) = Recorded By, (t) = Taken By, (c) = Cosigned By    Initials Name Provider Type    MM Alverto Calderon, PT Physical Therapist    WL Jenaro Ovalle, PT Student PT Student          PT Assessment/Plan     Row Name 07/27/21 1300          PT Assessment    Functional Limitations  Decreased safety during functional activities;Limitation in home management;Limitations in community activities;Limitations in functional capacity and performance;Performance in leisure activities;Performance in self-care ADL;Performance in sport activities  -MM (r) WL (t) MM (c)     Impairments  Endurance;Muscle strength;Pain;Poor body mechanics;Posture;Range of motion  -MM (r) WL (t) MM (c)     Assessment Comments  Client presents with evolving signs and symptoms of low complexity consistent with low back pain with mobility deficits. Client demonstrates deficits in AROM with left sided low back pain. Client demonstrates good strength in BLEs, but has deficits with hip extension with pain aggravation of lumbar spine noted. Client is limited in functional mobility and activity tolerance due to these deficits. Client would benefit from [facility] services to address these deficits and improve her strength, pain, and activity tolerance.  -MM (r) WL (t) MM (c)     Please refer to paper survey for additional self-reported information  Yes  -MM (r) WL (t) MM  (c)     Rehab Potential  Good  -MM (r) WL (t) MM (c)     Patient/caregiver participated in establishment of treatment plan and goals  Yes  -MM (r) WL (t) MM (c)     Patient would benefit from skilled therapy intervention  Yes  -MM (r) WL (t) MM (c)        PT Plan    PT Frequency  1x/week;2x/week  -MM (r) WL (t) MM (c)     Predicted Duration of Therapy Intervention (PT)  8-10 visits  -MM (r) WL (t) MM (c)     Planned CPT's?  PT EVAL LOW COMPLEXITY: 67065;PT RE-EVAL: 78917;PT THER PROC EA 15 MIN: 84179;PT THER ACT EA 15 MIN: 72940;PT MANUAL THERAPY EA 15 MIN: 95207;PT NEUROMUSC RE-EDUCATION EA 15 MIN: 73585  -MM (r) WL (t) MM (c)     PT Plan Comments  Plan of care to include stretching, ROM, strengthening, and manual therapy as appropriate.  -MM (r) WL (t) MM (c)       User Key  (r) = Recorded By, (t) = Taken By, (c) = Cosigned By    Initials Name Provider Type    Alverto Martino, PT Physical Therapist    Jenaro Rosa, PT Student PT Student        OP Exercises     Row Name 07/27/21 1300             Subjective Comments    Subjective Comments  Pain is localized to low back and left hip. Client has pain in both knees as well. Client has been dealing with back pain for around 30 years. Left greater than right.  -MM (r) WL (t) MM (c)         Subjective Pain    Able to rate subjective pain?  yes  -MM (r) WL (t) MM (c)      Pre-Treatment Pain Level  4  -MM (r) WL (t) MM (c)      Post-Treatment Pain Level  3  -MM (r) WL (t) MM (c)        User Key  (r) = Recorded By, (t) = Taken By, (c) = Cosigned By    Initials Name Provider Type    Alverto Martino, PT Physical Therapist    Jenaro Rosa, PT Student PT Student      Outcome Measure Options: Modifed Owestry  Modified Oswestry  Modified Oswestry Score/Comments: 22%    Time Calculation:     Start Time: 1300  Untimed Charges  PT Eval/Re-eval Minutes: 60  Total Minutes  Untimed Charges Total Minutes: 60   Total Minutes: 60     Therapy Charges for  Today     Code Description Service Date Service Provider Modifiers Qty    14825487119 HC PT EVAL LOW COMPLEXITY 4 7/27/2021 Alverto Calderon, PT GP 1        PT G-Codes  Outcome Measure Options: Modifed Owestry  Modified Oswestry Score/Comments: 22%       Alverto Calderon, PT  7/27/2021

## 2021-08-03 ENCOUNTER — APPOINTMENT (OUTPATIENT)
Dept: PHYSICAL THERAPY | Facility: HOSPITAL | Age: 61
End: 2021-08-03

## 2021-08-05 ENCOUNTER — APPOINTMENT (OUTPATIENT)
Dept: PHYSICAL THERAPY | Facility: HOSPITAL | Age: 61
End: 2021-08-05

## 2021-08-10 ENCOUNTER — APPOINTMENT (OUTPATIENT)
Dept: PHYSICAL THERAPY | Facility: HOSPITAL | Age: 61
End: 2021-08-10

## 2021-08-12 ENCOUNTER — APPOINTMENT (OUTPATIENT)
Dept: PHYSICAL THERAPY | Facility: HOSPITAL | Age: 61
End: 2021-08-12

## 2021-08-17 ENCOUNTER — APPOINTMENT (OUTPATIENT)
Dept: PHYSICAL THERAPY | Facility: HOSPITAL | Age: 61
End: 2021-08-17

## 2021-08-19 ENCOUNTER — APPOINTMENT (OUTPATIENT)
Dept: PHYSICAL THERAPY | Facility: HOSPITAL | Age: 61
End: 2021-08-19

## 2021-08-26 ENCOUNTER — HOSPITAL ENCOUNTER (OUTPATIENT)
Dept: PHYSICAL THERAPY | Facility: HOSPITAL | Age: 61
Setting detail: THERAPIES SERIES
Discharge: HOME | End: 2021-08-26

## 2021-08-26 DIAGNOSIS — Z74.09 IMPAIRED FUNCTIONAL MOBILITY AND ACTIVITY TOLERANCE: ICD-10-CM

## 2021-08-26 DIAGNOSIS — G89.29 CHRONIC LEFT-SIDED LOW BACK PAIN WITHOUT SCIATICA: Primary | ICD-10-CM

## 2021-08-26 DIAGNOSIS — M54.50 CHRONIC LEFT-SIDED LOW BACK PAIN WITHOUT SCIATICA: Primary | ICD-10-CM

## 2021-08-26 PROCEDURE — 97110 THERAPEUTIC EXERCISES: CPT

## 2021-08-31 ENCOUNTER — HOSPITAL ENCOUNTER (OUTPATIENT)
Dept: PHYSICAL THERAPY | Facility: HOSPITAL | Age: 61
Setting detail: THERAPIES SERIES
Discharge: HOME | End: 2021-08-31

## 2021-08-31 DIAGNOSIS — G89.29 CHRONIC LEFT-SIDED LOW BACK PAIN WITHOUT SCIATICA: Primary | ICD-10-CM

## 2021-08-31 DIAGNOSIS — M54.50 CHRONIC LEFT-SIDED LOW BACK PAIN WITHOUT SCIATICA: Primary | ICD-10-CM

## 2021-08-31 PROCEDURE — 97110 THERAPEUTIC EXERCISES: CPT

## 2021-09-07 ENCOUNTER — HOSPITAL ENCOUNTER (OUTPATIENT)
Dept: PHYSICAL THERAPY | Facility: HOSPITAL | Age: 61
Setting detail: THERAPIES SERIES
Discharge: HOME | End: 2021-09-07

## 2021-09-07 DIAGNOSIS — M54.50 CHRONIC LEFT-SIDED LOW BACK PAIN WITHOUT SCIATICA: Primary | ICD-10-CM

## 2021-09-07 DIAGNOSIS — G89.29 CHRONIC LEFT-SIDED LOW BACK PAIN WITHOUT SCIATICA: Primary | ICD-10-CM

## 2021-09-07 DIAGNOSIS — Z74.09 IMPAIRED FUNCTIONAL MOBILITY AND ACTIVITY TOLERANCE: ICD-10-CM

## 2021-09-07 PROCEDURE — 97110 THERAPEUTIC EXERCISES: CPT

## 2021-09-07 NOTE — THERAPY TREATMENT NOTE
Outpatient Physical Therapy Ortho Treatment Note  [facility]     Patient Name: Nancy Galvan  : 1960  MRN: [id number]  Today's Date: 2021      Visit Date: 2021    Visit Dx:    ICD-10-CM ICD-9-CM   1. Chronic left-sided low back pain without sciatica  M54.5 724.2    G89.29 338.29   2. Impaired functional mobility and activity tolerance  Z74.09 V49.89       Past Medical History:   Diagnosis Date   • OA (osteoarthritis of spine)    • Osteoporosis    • Vitamin D deficiency         Past Surgical History:   Procedure Laterality Date   •  SECTION     • EYE SURGERY           PT Assessment/Plan     Row Name 21 0900          PT Assessment    Assessment Comments  exercises in the clinic were all tolerated well today. She had mild pain overall and no increase during treatment.  -MM        PT Plan    PT Plan Comments  Cont per plan of treatment.  -MM       User Key  (r) = Recorded By, (t) = Taken By, (c) = Cosigned By    Initials Name Provider Type    Alverto Martino, PT Physical Therapist          OP Exercises     Row Name 21 09             Subjective Comments    Subjective Comments  No new complaints.   -MM         Subjective Pain    Able to rate subjective pain?  yes  -MM      Pre-Treatment Pain Level  2  -MM      Post-Treatment Pain Level  1  -MM         Total Minutes    07551 - PT Therapeutic Exercise Minutes  30  -MM         Exercise 1    Exercise Name 1  NuStep L6  -MM      Time 1  5 min  -MM         Exercise 2    Exercise Name 2  bridge and curl  -MM      Reps 2  15  -MM         Exercise 3    Exercise Name 3  squat and raise  -MM      Reps 3  10/10  -MM      Additional Comments  3# DB  -MM         Exercise 4    Exercise Name 4  standing leg kicks w band  -MM      Reps 4  15 ea  -MM      Additional Comments  yellow  -MM         Exercise 5    Exercise Name 5  Danita curl-up  -MM      Reps 5  10/10  -MM         Exercise 6    Exercise Name 6  side step/monster walk  -MM       Reps 6  10/10  -MM      Time 6  --  -MM      Additional Comments  yellow  -MM         Exercise 7    Exercise Name 7  Superman's  -MM      Reps 7  15  -MM         Exercise 8    Exercise Name 8  step ups to 6 inch step w support  -MM      Reps 8  10/10  -MM         Exercise 9    Exercise Name 9  standing shoulder abduction 3# DB  -MM      Reps 9  10 ea  -MM         Exercise 10    Exercise Name 10  LtR  -MM      Reps 10  15  -MM         Exercise 11    Exercise Name 11  press up  -MM      Reps 11  15  -MM         Exercise 12    Exercise Name 12  prone alt arm/leg raise  -MM      Reps 12  10/10  -MM        User Key  (r) = Recorded By, (t) = Taken By, (c) = Cosigned By    Initials Name Provider Type    MM Alverto Calderon, PT Physical Therapist        Time Calculation:   Start Time: 0900  Timed Charges  38390 - PT Therapeutic Exercise Minutes: 30  Total Minutes  Timed Charges Total Minutes: 30   Total Minutes: 30  Therapy Charges for Today     Code Description Service Date Service Provider Modifiers Qty    22370525358 HC PT THER PROC EA 15 MIN 9/7/2021 Alverto Calderon, PT GP 2          Alverto Calderon, PT  9/7/2021

## 2021-09-14 ENCOUNTER — HOSPITAL ENCOUNTER (OUTPATIENT)
Dept: PHYSICAL THERAPY | Facility: HOSPITAL | Age: 61
Setting detail: THERAPIES SERIES
Discharge: HOME | End: 2021-09-14

## 2021-09-14 DIAGNOSIS — M54.50 CHRONIC LEFT-SIDED LOW BACK PAIN WITHOUT SCIATICA: Primary | ICD-10-CM

## 2021-09-14 DIAGNOSIS — Z74.09 IMPAIRED FUNCTIONAL MOBILITY AND ACTIVITY TOLERANCE: ICD-10-CM

## 2021-09-14 DIAGNOSIS — G89.29 CHRONIC LEFT-SIDED LOW BACK PAIN WITHOUT SCIATICA: Primary | ICD-10-CM

## 2021-09-14 PROCEDURE — 97110 THERAPEUTIC EXERCISES: CPT

## 2021-09-14 NOTE — THERAPY TREATMENT NOTE
Outpatient Physical Therapy Ortho Treatment Note  [facility]     Patient Name: Nancy Galvan  : 1960  MRN: [id number]  Today's Date: 2021      Visit Date: 2021    Visit Dx:    ICD-10-CM ICD-9-CM   1. Chronic left-sided low back pain without sciatica  M54.5 724.2    G89.29 338.29   2. Impaired functional mobility and activity tolerance  Z74.09 V49.89        Past Medical History:   Diagnosis Date   • OA (osteoarthritis of spine)    • Osteoporosis    • Vitamin D deficiency         Past Surgical History:   Procedure Laterality Date   •  SECTION     • EYE SURGERY           PT Assessment/Plan     Row Name 21 1430          PT Assessment    Assessment Comments  She reported mild knee soreness with squatting/lunging activity today.  Overall exercises in the clinic were tolerated well.  -MM        PT Plan    PT Plan Comments  Continue per plan of treatment with progression of exercise as needed.  -MM       User Key  (r) = Recorded By, (t) = Taken By, (c) = Cosigned By    Initials Name Provider Type    Alverto Martino, PT Physical Therapist            OP Exercises     Row Name 21 1430             Subjective Comments    Subjective Comments  Client reports back pain is intermittent.  -MM         Subjective Pain    Able to rate subjective pain?  yes  -MM      Pre-Treatment Pain Level  2  -MM      Post-Treatment Pain Level  1  -MM         Total Minutes    71714 - PT Therapeutic Exercise Minutes  30  -MM         Exercise 1    Exercise Name 1  NuStep L6  -MM      Time 1  5 min  -MM         Exercise 2    Exercise Name 2  Traveling forward lunge and bicep curl  -MM      Reps 2  10 times each  -MM         Exercise 3    Exercise Name 3  Lateral lunge and ball raise  -MM      Reps 3  10 each  -MM         Exercise 4    Exercise Name 4  Squats on Bosu  -MM      Reps 4  15  -MM         Exercise 5    Exercise Name 5  Step up and hip hike  -MM      Reps 5  15 each  -MM         Exercise 6     Exercise Name 6  Standing obliques 4 pound dumbbells  -MM      Reps 6  15 each  -MM         Exercise 7    Exercise Name 7  Shoulder abduction 4 pound dumbbells  -MM      Reps 7  12 each  -MM         Exercise 8    Exercise Name 8  Superman's  -MM      Reps 8  10/10  -MM         Exercise 9    Exercise Name 9  Bridge and curl w ball  -MM      Reps 9  15  -MM         Exercise 10    Exercise Name 10  Prone press ups  -MM      Reps 10  15  -MM         Exercise 11    Exercise Name 11  quadruped alternate arm/leg raise  -MM      Reps 11  10 each  -MM         Exercise 12    Exercise Name 12  Bicycle kicks  -MM      Reps 12  15  -MM        User Key  (r) = Recorded By, (t) = Taken By, (c) = Cosigned By    Initials Name Provider Type    MM Alverto Calderon, PT Physical Therapist        Time Calculation:   Start Time: 1430  Timed Charges  29552 - PT Therapeutic Exercise Minutes: 30  Total Minutes  Timed Charges Total Minutes: 30   Total Minutes: 30  Therapy Charges for Today     Code Description Service Date Service Provider Modifiers Qty    98891134326  PT THER PROC EA 15 MIN 9/14/2021 Alverto Calderon, PT GP 2        Alverto Calderon, PT  9/14/2021

## 2021-09-23 ENCOUNTER — HOSPITAL ENCOUNTER (OUTPATIENT)
Dept: PHYSICAL THERAPY | Facility: HOSPITAL | Age: 61
Setting detail: THERAPIES SERIES
Discharge: HOME | End: 2021-09-23

## 2021-09-23 DIAGNOSIS — M54.50 CHRONIC LEFT-SIDED LOW BACK PAIN WITHOUT SCIATICA: Primary | ICD-10-CM

## 2021-09-23 DIAGNOSIS — Z74.09 IMPAIRED FUNCTIONAL MOBILITY AND ACTIVITY TOLERANCE: ICD-10-CM

## 2021-09-23 DIAGNOSIS — G89.29 CHRONIC LEFT-SIDED LOW BACK PAIN WITHOUT SCIATICA: Primary | ICD-10-CM

## 2021-09-23 PROCEDURE — 97110 THERAPEUTIC EXERCISES: CPT

## 2021-09-23 NOTE — THERAPY PROGRESS REPORT/RE-CERT
Outpatient Physical Therapy Ortho Progress Note   [facility]     Patient Name: Nancy Galvan  : 1960  MRN: [id number]  Today's Date: 2021      Visit Date: 2021    Visit Dx:    ICD-10-CM ICD-9-CM   1. Chronic left-sided low back pain without sciatica  M54.5 724.2    G89.29 338.29   2. Impaired functional mobility and activity tolerance  Z74.09 V49.89     Past Medical History:   Diagnosis Date   • OA (osteoarthritis of spine)    • Osteoporosis    • Vitamin D deficiency         Past Surgical History:   Procedure Laterality Date   •  SECTION     • EYE SURGERY       PT Ortho     Row Name 21 1442       Posture/Observations    Posture/Observations Comments  Palpation: mild tenderness mid and paravertebrals mid thoracic to mid lumbar region.  -MM       Head/Neck/Trunk    Trunk Extension AROM  prone 40 degrees  -MM    Trunk Flexion AROM  WNL  -MM    Trunk Lt Lateral Flexion AROM  min loss  -MM    Trunk Rt Lateral Flexion AROM  min loss  -MM    Trunk Lt Rotation AROM  WNL  -MM    Trunk Rt Rotation AROM  WNL  -MM       MMT Right Lower Ext    Rt Hip Flexion MMT, Gross Movement  (5/5) normal  -MM    Rt Hip Extension MMT, Gross Movement  (4+/5) good plus  -MM       MMT Left Lower Ext    Lt Hip Extension MMT, Gross Movement  (4+/5) good plus  -MM       Sensation    Sensation WNL?  WNL  -MM       Transfers    Comment (Transfers)  no pain w transfers today  -MM       Gait/Stairs (Locomotion)    Comment (Gait/Stairs)  normal karen  -MM      User Key  (r) = Recorded By, (t) = Taken By, (c) = Cosigned By    Initials Name Provider Type    Alverto Martino, PT Physical Therapist          PT Assessment/Plan     Row Name 21 1500          PT Assessment    Functional Limitations  Limitation in home management;Limitations in community activities;Performance in leisure activities;Performance in self-care ADL  -MM     Impairments  Pain;Muscle strength  -MM     Assessment Comments  Client  continues with back pain that comes and goes. Some days she doesn't have any pain and other days it bothers her most of the day. She tolerated exercises well in the clinic today. She demonstrated better trunk ROM. She still has mild hip weakness. Overall tolerance to exercise activity is improving. Modified Oswestry score improved from 36% to 24%. Further skilled care is required to minimize pain and improve overall function.  -MM     Please refer to paper survey for additional self-reported information  Yes  -MM     Rehab Potential  Good  -MM     Patient/caregiver participated in establishment of treatment plan and goals  Yes  -MM     Patient would benefit from skilled therapy intervention  Yes  -MM        PT Plan    PT Frequency  2x/week;1x/week  -MM     Predicted Duration of Therapy Intervention (PT)  6-8 visits  -MM     Planned CPT's?  PT THER PROC EA 15 MIN: 81424;PT THER ACT EA 15 MIN: 41880;PT MANUAL THERAPY EA 15 MIN: 69291  -MM     PT Plan Comments  Continue to work on trunk strength and mobility.  -MM       User Key  (r) = Recorded By, (t) = Taken By, (c) = Cosigned By    Initials Name Provider Type    MM Alverto Calderon, PT Physical Therapist        OP Exercises     Row Name 09/23/21 1448             Subjective Comments    Subjective Comments  Client reports no pain today, but she had quite a bit of pain 2 days ago and it lasted throughout the day.  -MM         Subjective Pain    Able to rate subjective pain?  yes  -MM      Pre-Treatment Pain Level  0  -MM      Post-Treatment Pain Level  0  -MM         Total Minutes    36191 - PT Therapeutic Exercise Minutes  30  -MM         Exercise 1    Exercise Name 1  NuStep L6  -MM      Time 1  5 min  -MM         Exercise 2    Exercise Name 2  Traveling forward lunge and bicep curl  -MM      Reps 2  10 times each  -MM         Exercise 3    Exercise Name 3  Lateral lunge and ball raise  -MM      Reps 3  10 each  -MM         Exercise 4    Exercise Name 4  Squats on  Bosu  -MM      Reps 4  15  -MM         Exercise 5    Exercise Name 5  Step up and hip hike  -MM      Reps 5  15 each  -MM         Exercise 6    Exercise Name 6  supermans  -MM      Reps 6  15 each  -MM         Exercise 7    Exercise Name 7  prone alt arm/leg raise  -MM      Reps 7  12 each  -MM         Exercise 8    Exercise Name 8  bicycle kicks  -MM      Reps 8  10/10  -MM         Exercise 9    Exercise Name 9  bridge  -MM      Reps 9  15  -MM         Exercise 10    Exercise Name 10  SL bridge  -MM      Reps 10  15  -MM         Exercise 11    Exercise Name 11  press ups  -MM      Reps 11  15  -MM         Exercise 12    Exercise Name 12  squat and raise bar  -MM      Reps 12  15/15  -MM         Exercise 13    Exercise Name 13  quad stretch, hamstring stretch, calf stretch  -MM      Time 13  3 min  -MM        User Key  (r) = Recorded By, (t) = Taken By, (c) = Cosigned By    Initials Name Provider Type    Alverto Martino, PT Physical Therapist          PT OP Goals     Row Name 09/23/21 1442          PT Short Term Goals    STG Date to Achieve  08/17/21  -MM     STG 1  Client will perform pressups 10x without pain for improved functional mobility.  -MM     STG 1 Progress  Met  -MM     STG 2  Client will improve score on Modified Owestry to 15% or better for improved activity tolerance.  -MM     STG 2 Progress  Ongoing  -MM     STG 2 Progress Comments  some progress  -MM     STG 3  Client will improve hip strength to 4+/5 or better with MMT for improved strength.  -MM     STG 3 Progress  Met  -MM        Long Term Goals    LTG Date to Achieve  09/07/21  -MM     LTG 1  Client will perform sit to stand 20x without pain for improved functional mobility.  -MM     LTG 1 Progress  Met  -MM     LTG 2  Client will improve score on Modified Owestry to 8% or better for improved activity tolerance.  -MM     LTG 2 Progress  Ongoing  -MM     LTG 3  Client will be independent with HEP for maximum potential benefit from skilled PT  services.  -MM     LTG 3 Progress  Ongoing  -MM        Time Calculation    PT Goal Re-Cert Due Date  10/25/21  -MM       User Key  (r) = Recorded By, (t) = Taken By, (c) = Cosigned By    Initials Name Provider Type    Alverto Martino, PT Physical Therapist           Outcome Measure Options: Modifed Owestry  Modified Oswestry  Modified Oswestry Score/Comments: 24%      Time Calculation:   Start Time: 1442  Timed Charges  69647 - PT Therapeutic Exercise Minutes: 30  Total Minutes  Timed Charges Total Minutes: 30   Total Minutes: 30  Therapy Charges for Today     Code Description Service Date Service Provider Modifiers Qty    [id number]  PT THER PROC EA 15 MIN 9/23/2021 Alverto Calderon, PT GP 2          PT G-Codes  Outcome Measure Options: Modifed Owestry  Modified Oswestry Score/Comments: 24%         Alverto Calderon, PT  9/23/2021

## 2021-09-30 ENCOUNTER — APPOINTMENT (OUTPATIENT)
Dept: PHYSICAL THERAPY | Facility: HOSPITAL | Age: 61
End: 2021-09-30

## 2021-10-04 ENCOUNTER — HOSPITAL ENCOUNTER (OUTPATIENT)
Dept: PHYSICAL THERAPY | Facility: HOSPITAL | Age: 61
Setting detail: THERAPIES SERIES
Discharge: HOME | End: 2021-10-04

## 2021-10-04 DIAGNOSIS — G89.29 CHRONIC LEFT-SIDED LOW BACK PAIN WITHOUT SCIATICA: Primary | ICD-10-CM

## 2021-10-04 DIAGNOSIS — Z74.09 IMPAIRED FUNCTIONAL MOBILITY AND ACTIVITY TOLERANCE: ICD-10-CM

## 2021-10-04 DIAGNOSIS — M54.50 CHRONIC LEFT-SIDED LOW BACK PAIN WITHOUT SCIATICA: Primary | ICD-10-CM

## 2021-10-04 PROCEDURE — 97110 THERAPEUTIC EXERCISES: CPT

## 2021-10-04 NOTE — THERAPY TREATMENT NOTE
Outpatient Physical Therapy Ortho Treatment Note  [facility]     Patient Name: Nancy Galvan  : 1960  MRN: [id number]  Today's Date: 10/4/2021      Visit Date: 10/04/2021    Visit Dx:    ICD-10-CM ICD-9-CM   1. Chronic left-sided low back pain without sciatica  M54.50 724.2    G89.29 338.29   2. Impaired functional mobility and activity tolerance  Z74.09 V49.89      Past Medical History:   Diagnosis Date   • OA (osteoarthritis of spine)    • Osteoporosis    • Vitamin D deficiency         Past Surgical History:   Procedure Laterality Date   •  SECTION     • EYE SURGERY           PT Assessment/Plan     Row Name 10/04/21 1430          PT Assessment    Assessment Comments  exercises in the clinic were tolerated well. She reports mild back soreness overall with activity.  -MM        PT Plan    PT Plan Comments  Continue per plan of treatment.  -MM       User Key  (r) = Recorded By, (t) = Taken By, (c) = Cosigned By    Initials Name Provider Type    Alverto Martino, PT Physical Therapist        OP Exercises     Row Name 10/04/21 1430             Subjective Comments    Subjective Comments  Client reports mild back pain today. she reports that exercises do seem to help provide some relief.   -MM         Subjective Pain    Able to rate subjective pain?  yes  -MM      Pre-Treatment Pain Level  3  -MM      Post-Treatment Pain Level  2  -MM         Total Minutes    49219 - PT Therapeutic Exercise Minutes  30  -MM         Exercise 1    Exercise Name 1  NuStep L6  -MM      Time 1  5 min  -MM         Exercise 2    Exercise Name 2  Traveling forward lunge and small twist  -MM      Reps 2  10 times each  -MM         Exercise 3    Exercise Name 3  Lateral lunge and slide  -MM      Reps 3  10 each  -MM         Exercise 4    Exercise Name 4  Squats on Bosu  -MM      Reps 4  15  -MM         Exercise 5    Exercise Name 5  balance and leg pull  -MM      Reps 5  15 each  -MM         Exercise 6    Exercise Name  6  supermans  -MM      Reps 6  15 each  -MM         Exercise 7    Exercise Name 7  prone press ups  -MM      Reps 7  12 each  -MM         Exercise 8    Exercise Name 8  wes curl up  -MM      Reps 8  12/12  -MM         Exercise 9    Exercise Name 9  bridge and curl w ball  -MM      Reps 9  15  -MM         Exercise 10    Exercise Name 10  overhead press/ lateral raise abduction  -MM      Reps 10  15/15  -MM      Additional Comments  4# DB  -MM         Exercise 11    Exercise Name 11  bird dog  -MM      Reps 11  15  -MM         Exercise 12    Exercise Name 12  bicycle kicks  -MM      Reps 12  15  -MM         Exercise 13    Exercise Name 13  quad stretch, hamstring stretch, calf stretch  -MM      Time 13  3 min  -MM        User Key  (r) = Recorded By, (t) = Taken By, (c) = Cosigned By    Initials Name Provider Type    MM Alverto Calderon, PT Physical Therapist          Time Calculation:   Start Time: 1430  Timed Charges  19622 - PT Therapeutic Exercise Minutes: 30  Total Minutes  Timed Charges Total Minutes: 30   Total Minutes: 30  Therapy Charges for Today     Code Description Service Date Service Provider Modifiers Qty    04121400229  PT THER PROC EA 15 MIN 10/4/2021 Alverto Calderon, PT GP 2        Alverto Calderon, PT  10/4/2021

## 2021-10-11 ENCOUNTER — HOSPITAL ENCOUNTER (OUTPATIENT)
Dept: PHYSICAL THERAPY | Facility: HOSPITAL | Age: 61
Setting detail: THERAPIES SERIES
Discharge: HOME | End: 2021-10-11

## 2021-10-11 DIAGNOSIS — Z74.09 IMPAIRED FUNCTIONAL MOBILITY AND ACTIVITY TOLERANCE: ICD-10-CM

## 2021-10-11 DIAGNOSIS — M54.50 CHRONIC LEFT-SIDED LOW BACK PAIN WITHOUT SCIATICA: Primary | ICD-10-CM

## 2021-10-11 DIAGNOSIS — G89.29 CHRONIC LEFT-SIDED LOW BACK PAIN WITHOUT SCIATICA: Primary | ICD-10-CM

## 2021-10-11 PROCEDURE — 97110 THERAPEUTIC EXERCISES: CPT

## 2021-10-11 NOTE — THERAPY TREATMENT NOTE
Outpatient Physical Therapy Ortho Treatment Note  [facility]     Patient Name: Nancy Galvan  : 1960  MRN: [id number]  Today's Date: 10/11/2021      Visit Date: 10/11/2021    Visit Dx:    ICD-10-CM ICD-9-CM   1. Chronic left-sided low back pain without sciatica  M54.50 724.2    G89.29 338.29   2. Impaired functional mobility and activity tolerance  Z74.09 V49.89        Past Medical History:   Diagnosis Date   • OA (osteoarthritis of spine)    • Osteoporosis    • Vitamin D deficiency         Past Surgical History:   Procedure Laterality Date   •  SECTION     • EYE SURGERY          PT Assessment/Plan     Row Name 10/11/21 1125          PT Assessment    Assessment Comments The only exercise that bothered her today was the lunges. She noted some soreness in her knees. She was able to ball squats well though and without difficulty. Overall she reported feeling better at the conclusion of treatment.  -MM            PT Plan    PT Plan Comments Continue per plan of treatment.  -MM           User Key  (r) = Recorded By, (t) = Taken By, (c) = Cosigned By    Initials Name Provider Type    Alverto Martino, PT Physical Therapist               OP Exercises     Row Name 10/11/21 1125             Subjective Comments    Subjective Comments Client reports some weakness in her back today and some joint pain in multiple areas today.  -MM              Subjective Pain    Able to rate subjective pain? yes  -MM      Pre-Treatment Pain Level 2  -MM      Post-Treatment Pain Level 2  -MM              Total Minutes    10107 - PT Therapeutic Exercise Minutes 30  -MM              Exercise 1    Exercise Name 1 NuStep L6  -MM      Time 1 6 min  -MM              Exercise 2    Exercise Name 2 Traveling forward lunge  -MM      Reps 2 5 ea  -MM      Additional Comments stopped due to knee soreness  -MM              Exercise 3    Exercise Name 3 squats w ball  -MM      Reps 3 15  -MM              Exercise 4    Exercise  Name 4 trunk extension w ball kneeling on foam  -MM      Reps 4 15  -MM              Exercise 5    Exercise Name 5 sidelying hip abduction  -MM      Reps 5 15 each  -MM              Exercise 6    Exercise Name 6 supermans  -MM      Reps 6 15 each  -MM              Exercise 7    Exercise Name 7 prone press ups  -MM      Reps 7 12 each  -MM              Exercise 8    Exercise Name 8 wes curl up  -MM      Reps 8 12/12  -MM              Exercise 9    Exercise Name 9 bridge and curl w ball  -MM      Reps 9 15  -MM              Exercise 10    Exercise Name 10 overhead press  -MM      Reps 10 15  -MM      Additional Comments 3#  -MM              Exercise 11    Exercise Name 11 bird dog  -MM      Reps 11 15  -MM              Exercise 12    Exercise Name 12 bicycle kicks  -MM      Reps 12 15  -MM              Exercise 13    Exercise Name 13 quad stretch, hamstring stretch, calf stretch  -MM      Time 13 3 min  -MM              Exercise 14    Exercise Name 14 standing push ups using table  -MM      Reps 14 10  -MM            User Key  (r) = Recorded By, (t) = Taken By, (c) = Cosigned By    Initials Name Provider Type    MM Alverto Calderon, PT Physical Therapist              Time Calculation:   Start Time: 1125  Timed Charges  03222 - PT Therapeutic Exercise Minutes: 30  Total Minutes  Timed Charges Total Minutes: 30   Total Minutes: 30  Therapy Charges for Today     Code Description Service Date Service Provider Modifiers Qty    [id number]  PT THER PROC EA 15 MIN 10/11/2021 Alverto Calderon, PT GP 2        Alvreto Calderon, PT  10/11/2021

## 2021-10-18 ENCOUNTER — HOSPITAL ENCOUNTER (OUTPATIENT)
Dept: PHYSICAL THERAPY | Facility: HOSPITAL | Age: 61
Setting detail: THERAPIES SERIES
Discharge: HOME | End: 2021-10-18

## 2021-10-18 DIAGNOSIS — M54.50 CHRONIC LEFT-SIDED LOW BACK PAIN WITHOUT SCIATICA: Primary | ICD-10-CM

## 2021-10-18 DIAGNOSIS — G89.29 CHRONIC LEFT-SIDED LOW BACK PAIN WITHOUT SCIATICA: Primary | ICD-10-CM

## 2021-10-18 DIAGNOSIS — Z74.09 IMPAIRED FUNCTIONAL MOBILITY AND ACTIVITY TOLERANCE: ICD-10-CM

## 2021-10-18 PROCEDURE — 97110 THERAPEUTIC EXERCISES: CPT

## 2021-10-18 NOTE — THERAPY TREATMENT NOTE
Outpatient Physical Therapy Ortho Treatment Note  [facility]     Patient Name: Nancy Galvan  : 1960  MRN: [id number]  Today's Date: 10/18/2021      Visit Date: 10/18/2021    Visit Dx:    ICD-10-CM ICD-9-CM   1. Chronic left-sided low back pain without sciatica  M54.50 724.2    G89.29 338.29   2. Impaired functional mobility and activity tolerance  Z74.09 V49.89       There is no problem list on file for this patient.       Past Medical History:   Diagnosis Date   • OA (osteoarthritis of spine)    • Osteoporosis    • Vitamin D deficiency         Past Surgical History:   Procedure Laterality Date   •  SECTION     • EYE SURGERY            PT Assessment/Plan     Row Name 10/18/21 1300          PT Assessment    Assessment Comments Overall exercises today were tolerated very well. She continues to experience some problems with frequent joint pain. Joint pain is not limited to her back, but noted in her knees and shoulders and throughout.  -MM            PT Plan    PT Plan Comments Continue per plan of treatment.  -MM           User Key  (r) = Recorded By, (t) = Taken By, (c) = Cosigned By    Initials Name Provider Type    Alverto Martino, PT Physical Therapist               OP Exercises     Row Name 10/18/21 1300             Subjective Comments    Subjective Comments Client reports mild soreness today in her shoulders and knees. She reports some lower lumbar soreness at night.  -MM              Subjective Pain    Able to rate subjective pain? yes  -MM      Pre-Treatment Pain Level 2  -MM      Post-Treatment Pain Level 2  -MM              Total Minutes    64245 - PT Therapeutic Exercise Minutes 30  -MM              Exercise 1    Exercise Name 1 NuStep L6  -MM      Time 1 6 min  -MM              Exercise 2    Exercise Name 2 Traveling forward lunge  -MM      Reps 2 8 ea  -MM              Exercise 3    Exercise Name 3 squats w ball  -MM      Reps 3 15  -MM              Exercise 4    Exercise  Name 4 trunk extension w ball kneeling on foam  -MM      Reps 4 15  -MM              Exercise 5    Exercise Name 5 sidelying hip abduction  -MM      Reps 5 15 each  -MM              Exercise 6    Exercise Name 6 supermans  -MM      Reps 6 15 each  -MM              Exercise 7    Exercise Name 7 prone press ups  -MM      Reps 7 12 each  -MM              Exercise 8    Exercise Name 8 wes curl up  -MM      Reps 8 12/12  -MM              Exercise 9    Exercise Name 9 bridge and curl w ball  -MM      Reps 9 15  -MM              Exercise 10    Exercise Name 10 overhead press/ bicep curl  -MM      Reps 10 15/15  -MM      Additional Comments 4#  -MM              Exercise 11    Exercise Name 11 bird dog  -MM      Reps 11 15  -MM              Exercise 12    Exercise Name 12 bicycle kicks  -MM      Reps 12 15  -MM              Exercise 13    Exercise Name 13 cat/camel stretch  -MM      Time 13 10x  -MM              Exercise 14    Exercise Name 14 standing push ups using table  -MM      Reps 14 20  -MM              Exercise 15    Exercise Name 15 standing trunk twists  -MM      Reps 15 15 ea  -MM      Additional Comments 4#  -MM            User Key  (r) = Recorded By, (t) = Taken By, (c) = Cosigned By    Initials Name Provider Type    MM Alverto Calderon, PT Physical Therapist            Time Calculation:   Start Time: 1300  Timed Charges  99470 - PT Therapeutic Exercise Minutes: 30  Total Minutes  Timed Charges Total Minutes: 30   Total Minutes: 30  Therapy Charges for Today     Code Description Service Date Service Provider Modifiers Qty    [id number]  PT THER PROC EA 15 MIN 10/18/2021 Alverto Calderon, PT GP 2          Alverto Calderon, PT  10/18/2021

## 2021-10-21 ENCOUNTER — HOSPITAL ENCOUNTER (OUTPATIENT)
Dept: PHYSICAL THERAPY | Facility: HOSPITAL | Age: 61
Setting detail: THERAPIES SERIES
Discharge: HOME | End: 2021-10-21

## 2021-10-21 DIAGNOSIS — Z74.09 IMPAIRED FUNCTIONAL MOBILITY AND ACTIVITY TOLERANCE: ICD-10-CM

## 2021-10-21 DIAGNOSIS — G89.29 CHRONIC LEFT-SIDED LOW BACK PAIN WITHOUT SCIATICA: Primary | ICD-10-CM

## 2021-10-21 DIAGNOSIS — M54.50 CHRONIC LEFT-SIDED LOW BACK PAIN WITHOUT SCIATICA: Primary | ICD-10-CM

## 2021-10-21 PROCEDURE — 97110 THERAPEUTIC EXERCISES: CPT

## 2021-10-21 NOTE — THERAPY PROGRESS REPORT/RE-CERT
Outpatient Physical Therapy Ortho Progress Note   [facility]     Patient Name: Nancy Galvan  : 1960  MRN: [id number]  Today's Date: 10/21/2021      Visit Date: 10/21/2021    Visit Dx:    ICD-10-CM ICD-9-CM   1. Chronic left-sided low back pain without sciatica  M54.50 724.2    G89.29 338.29   2. Impaired functional mobility and activity tolerance  Z74.09 V49.89      Past Medical History:   Diagnosis Date   • OA (osteoarthritis of spine)    • Osteoporosis    • Vitamin D deficiency         Past Surgical History:   Procedure Laterality Date   •  SECTION     • EYE SURGERY          PT Ortho     Row Name 10/21/21 3925       Subjective Comments    Subjective Comments Client reports some soreness today.  She feels like she strained her back a little bit on Tuesday performing back extension exercises.  -MM       Subjective Pain    Able to rate subjective pain? yes  -MM    Pre-Treatment Pain Level 3  -MM    Post-Treatment Pain Level 1  -MM       Posture/Observations    Posture/Observations Comments Palpation: tenderness left lumbar paravertebrals.  -MM       General ROM    GENERAL ROM COMMENTS client notes some discomfort with side reach and glide since having a transesophageal scope completed a month ago.  -MM       Head/Neck/Trunk    Trunk Extension AROM prone 42 degrees  -MM    Trunk Flexion AROM WNL  -MM    Trunk Lt Lateral Flexion AROM mod loss and soreness  -MM    Trunk Rt Lateral Flexion AROM mod loss and soreness  -MM    Trunk Lt Rotation AROM WNL  -MM    Trunk Rt Rotation AROM WNL  -MM       MMT (Manual Muscle Testing)    General MMT Comments mild back soreness with resisted hip extension on the left. held on prone trunk extension due to back soreness.  -MM       MMT Right Lower Ext    Rt Hip Flexion MMT, Gross Movement (5/5) normal  -MM    Rt Hip Extension MMT, Gross Movement (4+/5) good plus  -MM       MMT Left Lower Ext    Lt Hip Extension MMT, Gross Movement (4+/5) good plus  -MM           User Key  (r) = Recorded By, (t) = Taken By, (c) = Cosigned By    Initials Name Provider Type    MM Alverto Calderon, PT Physical Therapist               PT Assessment/Plan     Row Name 10/21/21 9647          PT Assessment    Functional Limitations Limitation in home management; Limitations in community activities; Performance in leisure activities; Performance in sport activities; Performance in self-care ADL  -MM     Impairments Pain; Muscle strength; Range of motion  -MM     Assessment Comments Overall client continues to notice some pain frequently throughout the week.  She she has joint pain often in multiple areas.  She also notes some left lumbar paravertebral soreness today.  She feels like she may have strained it a little bit doing back exercises last visit.  Exercises today were modified some to reduce stress on the back.  She had tolerated exercises in the clinic well.  No complaints today with exercises.  Overall trunk mobility is good.  She did have some soreness with trunk side bends left and right.  She felt this has been present since she had a procedure done a month ago.  Further skilled care is required to improve overall mobility and function.  Further skilled care is required for independence with home program to improve mobility and minimize pain.  -MM     Please refer to paper survey for additional self-reported information Yes  -MM     Rehab Potential Good  -MM     Patient/caregiver participated in establishment of treatment plan and goals Yes  -MM     Patient would benefit from skilled therapy intervention Yes  -MM            PT Plan    PT Frequency 2x/week  -MM     Predicted Duration of Therapy Intervention (PT) 2-3 more visits  -MM     Planned CPT's? PT THER PROC EA 15 MIN: 12666; PT THER ACT EA 15 MIN: 75137; PT MANUAL THERAPY EA 15 MIN: 08401  -MM     PT Plan Comments Continue per plan of treatment working on mobility and strength.  Prepare for discharge to home program within the next  couple of visits.  -MM           User Key  (r) = Recorded By, (t) = Taken By, (c) = Cosigned By    Initials Name Provider Type    MM Alverto Calderon, PT Physical Therapist                   OP Exercises     Row Name 10/21/21 6735             Subjective Comments    Subjective Comments Client reports some soreness today.  She feels like she strained her back a little bit on Tuesday performing back extension exercises.  -MM              Subjective Pain    Able to rate subjective pain? yes  -MM      Pre-Treatment Pain Level 3  -MM      Post-Treatment Pain Level 1  -MM              Total Minutes    82800 - PT Therapeutic Exercise Minutes 30  -MM              Exercise 1    Exercise Name 1 NuStep L6  -MM      Time 1 6 min  -MM              Exercise 2    Exercise Name 2 standing supported reverse lunges  -MM      Reps 2 10 ea  -MM              Exercise 3    Exercise Name 3 ball squats  -MM      Reps 3 10/10/10  -MM              Exercise 4    Exercise Name 4 bridge and curl w ball  -MM      Reps 4 15  -MM              Exercise 5    Exercise Name 5 sidelying upper trunk rotation  -MM      Reps 5 15/15  -MM              Exercise 6    Exercise Name 6 sidelying hip abduction  -MM      Reps 6 10/10  -MM              Exercise 7    Exercise Name 7 prone press ups  -MM      Reps 7 15  -MM              Exercise 8    Exercise Name 8 SKTC  -MM      Reps 8 10 ea  -MM              Exercise 9    Exercise Name 9 sit to stand overhead press  -MM      Reps 9 10  -MM      Additional Comments 4# DB  -MM              Exercise 10    Exercise Name 10 bird dog  -MM      Reps 10 10/10  -MM              Exercise 11    Exercise Name 11 hamstring stretch  -MM      Time 11 2 min  -MM              Exercise 12    Exercise Name 12 wes curl up  -MM      Reps 12 10/10  -MM              Exercise 13    Exercise Name 13 seated side reach  -MM      Reps 13 10/10  -MM              Exercise 14    Exercise Name 14 --  -MM      Reps 14 --  -MM            User  Key  (r) = Recorded By, (t) = Taken By, (c) = Cosigned By    Initials Name Provider Type    Alverto Martino, PT Physical Therapist                   PT OP Goals     Row Name 10/21/21 1345          PT Short Term Goals    STG Date to Achieve 08/17/21  -MM     STG 1 Client will perform pressups 10x without pain for improved functional mobility.  -MM     STG 1 Progress Met  -MM     STG 2 Client will improve score on Modified Owestry to 15% or better for improved activity tolerance.  -MM     STG 2 Progress Ongoing  -MM     STG 3 Client will improve hip strength to 4+/5 or better with MMT for improved strength.  -MM     STG 3 Progress Met  -MM            Long Term Goals    LTG Date to Achieve 09/07/21  -MM     LTG 1 Client will perform sit to stand 20x without pain for improved functional mobility.  -MM     LTG 1 Progress Met  -MM     LTG 2 Client will improve score on Modified Owestry to 8% or better for improved activity tolerance.  -MM     LTG 2 Progress Ongoing  -MM     LTG 3 Client will be independent with HEP for maximum potential benefit from skilled PT services.  -MM     LTG 3 Progress Ongoing  -MM            Time Calculation    PT Goal Re-Cert Due Date 01/19/22  -MM           User Key  (r) = Recorded By, (t) = Taken By, (c) = Cosigned By    Initials Name Provider Type    Alverto Martino, PT Physical Therapist                Outcome Measure Options: Modifed Owestry  Modified Oswestry  Modified Oswestry Score/Comments: 26%      Time Calculation:   Start Time: 1345  Timed Charges  39937 - PT Therapeutic Exercise Minutes: 30  Total Minutes  Timed Charges Total Minutes: 30   Total Minutes: 30  Therapy Charges for Today     Code Description Service Date Service Provider Modifiers Qty    [id number]  PT THER PROC EA 15 MIN 10/21/2021 Alverto Calderon, PT GP 2          PT G-Codes  Outcome Measure Options: Modifed Owestry  Modified Oswestry Score/Comments: 26%         Alverto Calderon, PT  10/21/2021

## 2021-10-25 ENCOUNTER — HOSPITAL ENCOUNTER (OUTPATIENT)
Dept: PHYSICAL THERAPY | Facility: HOSPITAL | Age: 61
Setting detail: THERAPIES SERIES
Discharge: HOME | End: 2021-10-25

## 2021-10-25 DIAGNOSIS — G89.29 CHRONIC LEFT-SIDED LOW BACK PAIN WITHOUT SCIATICA: Primary | ICD-10-CM

## 2021-10-25 DIAGNOSIS — M54.50 CHRONIC LEFT-SIDED LOW BACK PAIN WITHOUT SCIATICA: Primary | ICD-10-CM

## 2021-10-25 DIAGNOSIS — Z74.09 IMPAIRED FUNCTIONAL MOBILITY AND ACTIVITY TOLERANCE: ICD-10-CM

## 2021-10-25 PROCEDURE — 97110 THERAPEUTIC EXERCISES: CPT

## 2021-10-25 NOTE — THERAPY DISCHARGE NOTE
Outpatient Physical Therapy Ortho Treatment Note/Discharge Summary   [facility]     Patient Name: Nancy Galvan  : 1960  MRN: [id number]  Today's Date: 10/25/2021      Visit Date: 10/25/2021    Visit Dx:    ICD-10-CM ICD-9-CM   1. Chronic left-sided low back pain without sciatica  M54.50 724.2    G89.29 338.29   2. Impaired functional mobility and activity tolerance  Z74.09 V49.89        Past Medical History:   Diagnosis Date   • OA (osteoarthritis of spine)    • Osteoporosis    • Vitamin D deficiency         Past Surgical History:   Procedure Laterality Date   •  SECTION     • EYE SURGERY            PT Assessment/Plan     Row Name 10/25/21 1300          PT Assessment    Assessment Comments No complaints with exercises. She reports that exercises do help give her some relief. She demonstrated good understanding of the current exercise program.  -MM            PT Plan    PT Plan Comments Discharge due to independent with current program for home.  -MM           User Key  (r) = Recorded By, (t) = Taken By, (c) = Cosigned By    Initials Name Provider Type    Alverto Martino, PT Physical Therapist                 OP Exercises     Row Name 10/25/21 1300             Subjective Comments    Subjective Comments Client reports that her back feels okay today.  -MM              Subjective Pain    Able to rate subjective pain? yes  -MM      Pre-Treatment Pain Level 0  -MM      Post-Treatment Pain Level 0  -MM              Total Minutes    18922 - PT Therapeutic Exercise Minutes 30  -MM              Exercise 1    Exercise Name 1 NuStep L6  -MM      Time 1 5 min  -MM      Additional Comments L6  -MM              Exercise 2    Exercise Name 2 standing supported reverse lunges  -MM      Reps 2 10 ea  -MM              Exercise 3    Exercise Name 3 ball squats  -MM      Reps 3 10/10/10  -MM              Exercise 4    Exercise Name 4 bridge and curl w ball  -MM      Reps 4 15  -MM              Exercise 5     Exercise Name 5 sidelying upper trunk rotation  -MM      Reps 5 15/15  -MM              Exercise 6    Exercise Name 6 sidelying hip abduction  -MM      Reps 6 10/10  -MM              Exercise 7    Exercise Name 7 prone press ups  -MM      Reps 7 15  -MM              Exercise 8    Exercise Name 8 SKTC  -MM      Reps 8 10 ea  -MM              Exercise 9    Exercise Name 9 sit to stand overhead press  -MM      Reps 9 10  -MM              Exercise 10    Exercise Name 10 bird dog  -MM      Reps 10 10/10  -MM              Exercise 11    Exercise Name 11 hamstring stretch  -MM      Time 11 2 min  -MM              Exercise 12    Exercise Name 12 wes curl up  -MM      Reps 12 10/10  -MM              Exercise 13    Exercise Name 13 seated side reach  -MM      Reps 13 10/10  -MM            User Key  (r) = Recorded By, (t) = Taken By, (c) = Cosigned By    Initials Name Provider Type    Alverto Martino, PT Physical Therapist                 PT OP Goals     Row Name 10/25/21 1300          PT Short Term Goals    STG Date to Achieve 08/17/21  -MM     STG 1 Client will perform pressups 10x without pain for improved functional mobility.  -MM     STG 1 Progress Met  -MM     STG 2 Client will improve score on Modified Owestry to 15% or better for improved activity tolerance.  -MM     STG 2 Progress Not Met  -MM     STG 3 Client will improve hip strength to 4+/5 or better with MMT for improved strength.  -MM     STG 3 Progress Met  -MM            Long Term Goals    LTG Date to Achieve 09/07/21  -MM     LTG 1 Client will perform sit to stand 20x without pain for improved functional mobility.  -MM     LTG 1 Progress Met  -MM     LTG 2 Client will improve score on Modified Owestry to 8% or better for improved activity tolerance.  -MM     LTG 2 Progress Not Met  -MM     LTG 3 Client will be independent with HEP for maximum potential benefit from skilled PT services.  -MM     LTG 3 Progress Met  -MM           User Key  (r) =  Recorded By, (t) = Taken By, (c) = Cosigned By    Initials Name Provider Type    MM Alverto Calderon, PT Physical Therapist                Time Calculation:   Start Time: 1300  Timed Charges  99406 - PT Therapeutic Exercise Minutes: 30  Total Minutes  Timed Charges Total Minutes: 30   Total Minutes: 30  Therapy Charges for Today     Code Description Service Date Service Provider Modifiers Qty    [id number]  PT THER PROC EA 15 MIN 10/25/2021 Alverto Calderon, PT GP 2          Alverto Calderon, PT  10/25/2021